# Patient Record
Sex: FEMALE | Race: WHITE | URBAN - METROPOLITAN AREA
[De-identification: names, ages, dates, MRNs, and addresses within clinical notes are randomized per-mention and may not be internally consistent; named-entity substitution may affect disease eponyms.]

---

## 2020-10-20 ENCOUNTER — HOSPITAL ENCOUNTER (INPATIENT)
Age: 40
LOS: 2 days | Discharge: HOME | DRG: 438 | End: 2020-10-22
Attending: EMERGENCY MEDICINE | Admitting: FAMILY MEDICINE

## 2020-10-20 ENCOUNTER — APPOINTMENT (OUTPATIENT)
Dept: CT IMAGING | Age: 40
DRG: 438 | End: 2020-10-20
Attending: NURSE PRACTITIONER

## 2020-10-20 ENCOUNTER — APPOINTMENT (OUTPATIENT)
Dept: MRI IMAGING | Age: 40
DRG: 438 | End: 2020-10-20
Attending: FAMILY MEDICINE

## 2020-10-20 DIAGNOSIS — R11.2 NON-INTRACTABLE VOMITING WITH NAUSEA, UNSPECIFIED VOMITING TYPE: ICD-10-CM

## 2020-10-20 DIAGNOSIS — B18.2 CHRONIC HEPATITIS C WITHOUT HEPATIC COMA (HCC): ICD-10-CM

## 2020-10-20 DIAGNOSIS — K70.9 ALCOHOLIC LIVER DISEASE (HCC): ICD-10-CM

## 2020-10-20 DIAGNOSIS — K85.20 ALCOHOL-INDUCED ACUTE PANCREATITIS, UNSPECIFIED COMPLICATION STATUS: Primary | ICD-10-CM

## 2020-10-20 DIAGNOSIS — K70.10 ALCOHOLIC HEPATITIS WITHOUT ASCITES: ICD-10-CM

## 2020-10-20 PROBLEM — K85.90 ACUTE PANCREATITIS: Status: ACTIVE | Noted: 2020-10-20

## 2020-10-20 LAB
ALBUMIN SERPL-MCNC: 4.2 G/DL (ref 3.5–5)
ALBUMIN/GLOB SERPL: 1.2 {RATIO} (ref 1.1–2.2)
ALP SERPL-CCNC: 138 U/L (ref 45–117)
ALT SERPL-CCNC: 185 U/L (ref 12–78)
AMPHET UR QL SCN: NEGATIVE
ANION GAP SERPL CALC-SCNC: 17 MMOL/L (ref 5–15)
APPEARANCE UR: ABNORMAL
AST SERPL-CCNC: 373 U/L (ref 15–37)
BACTERIA URNS QL MICRO: ABNORMAL /HPF
BARBITURATES UR QL SCN: NEGATIVE
BASOPHILS # BLD: 0.1 K/UL (ref 0–0.1)
BASOPHILS NFR BLD: 1 % (ref 0–1)
BENZODIAZ UR QL: NEGATIVE
BILIRUB SERPL-MCNC: 1.4 MG/DL (ref 0.2–1)
BILIRUB UR QL: NEGATIVE
BUN SERPL-MCNC: 7 MG/DL (ref 6–20)
BUN/CREAT SERPL: 9 (ref 12–20)
CALCIUM SERPL-MCNC: 9.3 MG/DL (ref 8.5–10.1)
CANNABINOIDS UR QL SCN: NEGATIVE
CHLORIDE SERPL-SCNC: 98 MMOL/L (ref 97–108)
CHOLEST SERPL-MCNC: 200 MG/DL
CO2 SERPL-SCNC: 21 MMOL/L (ref 21–32)
COCAINE UR QL SCN: POSITIVE
COLOR UR: ABNORMAL
COMMENT, HOLDF: NORMAL
CREAT SERPL-MCNC: 0.76 MG/DL (ref 0.55–1.02)
DIFFERENTIAL METHOD BLD: ABNORMAL
DRUG SCRN COMMENT,DRGCM: ABNORMAL
EOSINOPHIL # BLD: 0 K/UL (ref 0–0.4)
EOSINOPHIL NFR BLD: 0 % (ref 0–7)
EPITH CASTS URNS QL MICRO: ABNORMAL /LPF
ERYTHROCYTE [DISTWIDTH] IN BLOOD BY AUTOMATED COUNT: 13.7 % (ref 11.5–14.5)
ETHANOL SERPL-MCNC: 54 MG/DL
GLOBULIN SER CALC-MCNC: 3.4 G/DL (ref 2–4)
GLUCOSE BLD STRIP.AUTO-MCNC: 72 MG/DL (ref 65–100)
GLUCOSE SERPL-MCNC: 97 MG/DL (ref 65–100)
GLUCOSE UR STRIP.AUTO-MCNC: NEGATIVE MG/DL
HAV IGM SER QL: NONREACTIVE
HBV CORE IGM SER QL: NONREACTIVE
HBV SURFACE AG SER QL: 0.13 INDEX
HBV SURFACE AG SER QL: NEGATIVE
HCG UR QL: NEGATIVE
HCT VFR BLD AUTO: 39.2 % (ref 35–47)
HCV AB SER IA-ACNC: 1.57 INDEX
HCV AB SERPL QL IA: REACTIVE
HCV COMMENT,HCGAC: ABNORMAL
HDLC SERPL-MCNC: 43 MG/DL
HDLC SERPL: 4.7 {RATIO} (ref 0–5)
HGB BLD-MCNC: 13 G/DL (ref 11.5–16)
HGB UR QL STRIP: NEGATIVE
IMM GRANULOCYTES # BLD AUTO: 0.1 K/UL (ref 0–0.04)
IMM GRANULOCYTES NFR BLD AUTO: 1 % (ref 0–0.5)
INR PPP: 1.2 (ref 0.9–1.1)
KETONES UR QL STRIP.AUTO: 80 MG/DL
LACTATE SERPL-SCNC: 2.2 MMOL/L (ref 0.4–2)
LACTATE SERPL-SCNC: 3.7 MMOL/L (ref 0.4–2)
LACTATE SERPL-SCNC: 6.5 MMOL/L (ref 0.4–2)
LDLC SERPL CALC-MCNC: 139.4 MG/DL (ref 0–100)
LEUKOCYTE ESTERASE UR QL STRIP.AUTO: ABNORMAL
LIPASE SERPL-CCNC: >3000 U/L (ref 73–393)
LIPID PROFILE,FLP: ABNORMAL
LYMPHOCYTES # BLD: 0.7 K/UL (ref 0.8–3.5)
LYMPHOCYTES NFR BLD: 6 % (ref 12–49)
MAGNESIUM SERPL-MCNC: 1.8 MG/DL (ref 1.6–2.4)
MAGNESIUM SERPL-MCNC: 1.9 MG/DL (ref 1.6–2.4)
MCH RBC QN AUTO: 35.6 PG (ref 26–34)
MCHC RBC AUTO-ENTMCNC: 33.2 G/DL (ref 30–36.5)
MCV RBC AUTO: 107.4 FL (ref 80–99)
METHADONE UR QL: NEGATIVE
MONOCYTES # BLD: 1.3 K/UL (ref 0–1)
MONOCYTES NFR BLD: 11 % (ref 5–13)
NEUTS SEG # BLD: 9.5 K/UL (ref 1.8–8)
NEUTS SEG NFR BLD: 81 % (ref 32–75)
NITRITE UR QL STRIP.AUTO: NEGATIVE
NRBC # BLD: 0 K/UL (ref 0–0.01)
NRBC BLD-RTO: 0 PER 100 WBC
OPIATES UR QL: POSITIVE
PCP UR QL: NEGATIVE
PH UR STRIP: 5 [PH] (ref 5–8)
PHOSPHATE SERPL-MCNC: 4.2 MG/DL (ref 2.6–4.7)
PHOSPHATE SERPL-MCNC: 4.6 MG/DL (ref 2.6–4.7)
PLATELET # BLD AUTO: 216 K/UL (ref 150–400)
PMV BLD AUTO: 10.3 FL (ref 8.9–12.9)
POTASSIUM SERPL-SCNC: 4.1 MMOL/L (ref 3.5–5.1)
PROT SERPL-MCNC: 7.6 G/DL (ref 6.4–8.2)
PROT UR STRIP-MCNC: 30 MG/DL
PROTHROMBIN TIME: 12.2 SEC (ref 9–11.1)
RBC # BLD AUTO: 3.65 M/UL (ref 3.8–5.2)
RBC #/AREA URNS HPF: ABNORMAL /HPF (ref 0–5)
RBC MORPH BLD: ABNORMAL
SAMPLES BEING HELD,HOLD: NORMAL
SERVICE CMNT-IMP: NORMAL
SODIUM SERPL-SCNC: 136 MMOL/L (ref 136–145)
SP GR UR REFRACTOMETRY: 1.03 (ref 1–1.03)
SP1: ABNORMAL
SP2: ABNORMAL
SP3: ABNORMAL
TRIGL SERPL-MCNC: 88 MG/DL (ref ?–150)
TROPONIN I SERPL-MCNC: <0.05 NG/ML
UR CULT HOLD, URHOLD: NORMAL
UROBILINOGEN UR QL STRIP.AUTO: 0.2 EU/DL (ref 0.2–1)
VLDLC SERPL CALC-MCNC: 17.6 MG/DL
WBC # BLD AUTO: 11.7 K/UL (ref 3.6–11)
WBC URNS QL MICRO: ABNORMAL /HPF (ref 0–4)

## 2020-10-20 PROCEDURE — 83605 ASSAY OF LACTIC ACID: CPT

## 2020-10-20 PROCEDURE — 85025 COMPLETE CBC W/AUTO DIFF WBC: CPT

## 2020-10-20 PROCEDURE — 87147 CULTURE TYPE IMMUNOLOGIC: CPT

## 2020-10-20 PROCEDURE — 74011000258 HC RX REV CODE- 258: Performed by: RADIOLOGY

## 2020-10-20 PROCEDURE — 85610 PROTHROMBIN TIME: CPT

## 2020-10-20 PROCEDURE — 36415 COLL VENOUS BLD VENIPUNCTURE: CPT

## 2020-10-20 PROCEDURE — 80307 DRUG TEST PRSMV CHEM ANLYZR: CPT

## 2020-10-20 PROCEDURE — 74011000258 HC RX REV CODE- 258: Performed by: FAMILY MEDICINE

## 2020-10-20 PROCEDURE — 81001 URINALYSIS AUTO W/SCOPE: CPT

## 2020-10-20 PROCEDURE — 96375 TX/PRO/DX INJ NEW DRUG ADDON: CPT

## 2020-10-20 PROCEDURE — 99284 EMERGENCY DEPT VISIT MOD MDM: CPT

## 2020-10-20 PROCEDURE — 74181 MRI ABDOMEN W/O CONTRAST: CPT

## 2020-10-20 PROCEDURE — 96374 THER/PROPH/DIAG INJ IV PUSH: CPT

## 2020-10-20 PROCEDURE — 81025 URINE PREGNANCY TEST: CPT

## 2020-10-20 PROCEDURE — 96361 HYDRATE IV INFUSION ADD-ON: CPT

## 2020-10-20 PROCEDURE — 80074 ACUTE HEPATITIS PANEL: CPT

## 2020-10-20 PROCEDURE — 84484 ASSAY OF TROPONIN QUANT: CPT

## 2020-10-20 PROCEDURE — 74011000636 HC RX REV CODE- 636: Performed by: RADIOLOGY

## 2020-10-20 PROCEDURE — 82962 GLUCOSE BLOOD TEST: CPT

## 2020-10-20 PROCEDURE — 84100 ASSAY OF PHOSPHORUS: CPT

## 2020-10-20 PROCEDURE — 80053 COMPREHEN METABOLIC PANEL: CPT

## 2020-10-20 PROCEDURE — 65660000000 HC RM CCU STEPDOWN

## 2020-10-20 PROCEDURE — 83690 ASSAY OF LIPASE: CPT

## 2020-10-20 PROCEDURE — 74177 CT ABD & PELVIS W/CONTRAST: CPT

## 2020-10-20 PROCEDURE — 74011250636 HC RX REV CODE- 250/636: Performed by: FAMILY MEDICINE

## 2020-10-20 PROCEDURE — 83735 ASSAY OF MAGNESIUM: CPT

## 2020-10-20 PROCEDURE — 87040 BLOOD CULTURE FOR BACTERIA: CPT

## 2020-10-20 PROCEDURE — 74011250636 HC RX REV CODE- 250/636: Performed by: NURSE PRACTITIONER

## 2020-10-20 PROCEDURE — 87086 URINE CULTURE/COLONY COUNT: CPT

## 2020-10-20 PROCEDURE — 80061 LIPID PANEL: CPT

## 2020-10-20 RX ORDER — LORAZEPAM 2 MG/ML
2 INJECTION INTRAMUSCULAR
Status: DISPENSED | OUTPATIENT
Start: 2020-10-20 | End: 2020-10-20

## 2020-10-20 RX ORDER — MORPHINE SULFATE 2 MG/ML
2 INJECTION, SOLUTION INTRAMUSCULAR; INTRAVENOUS
Status: DISCONTINUED | OUTPATIENT
Start: 2020-10-20 | End: 2020-10-22

## 2020-10-20 RX ORDER — DICYCLOMINE HYDROCHLORIDE 10 MG/ML
20 INJECTION INTRAMUSCULAR
Status: COMPLETED | OUTPATIENT
Start: 2020-10-20 | End: 2020-10-20

## 2020-10-20 RX ORDER — SODIUM CHLORIDE 9 MG/ML
1000 INJECTION, SOLUTION INTRAVENOUS ONCE
Status: COMPLETED | OUTPATIENT
Start: 2020-10-20 | End: 2020-10-20

## 2020-10-20 RX ORDER — LORAZEPAM 2 MG/ML
1 INJECTION INTRAMUSCULAR
Status: DISCONTINUED | OUTPATIENT
Start: 2020-10-20 | End: 2020-10-22

## 2020-10-20 RX ORDER — KETOROLAC TROMETHAMINE 30 MG/ML
30 INJECTION, SOLUTION INTRAMUSCULAR; INTRAVENOUS
Status: COMPLETED | OUTPATIENT
Start: 2020-10-20 | End: 2020-10-20

## 2020-10-20 RX ORDER — MORPHINE SULFATE 4 MG/ML
4 INJECTION INTRAVENOUS ONCE
Status: COMPLETED | OUTPATIENT
Start: 2020-10-20 | End: 2020-10-20

## 2020-10-20 RX ORDER — SODIUM CHLORIDE 0.9 % (FLUSH) 0.9 %
5-40 SYRINGE (ML) INJECTION AS NEEDED
Status: DISCONTINUED | OUTPATIENT
Start: 2020-10-20 | End: 2020-10-22 | Stop reason: HOSPADM

## 2020-10-20 RX ORDER — ONDANSETRON 2 MG/ML
4 INJECTION INTRAMUSCULAR; INTRAVENOUS
Status: COMPLETED | OUTPATIENT
Start: 2020-10-20 | End: 2020-10-20

## 2020-10-20 RX ORDER — HYDROMORPHONE HYDROCHLORIDE 1 MG/ML
1 INJECTION, SOLUTION INTRAMUSCULAR; INTRAVENOUS; SUBCUTANEOUS
Status: DISCONTINUED | OUTPATIENT
Start: 2020-10-20 | End: 2020-10-20

## 2020-10-20 RX ORDER — SODIUM CHLORIDE, SODIUM LACTATE, POTASSIUM CHLORIDE, CALCIUM CHLORIDE 600; 310; 30; 20 MG/100ML; MG/100ML; MG/100ML; MG/100ML
150 INJECTION, SOLUTION INTRAVENOUS CONTINUOUS
Status: DISCONTINUED | OUTPATIENT
Start: 2020-10-20 | End: 2020-10-22

## 2020-10-20 RX ORDER — SODIUM CHLORIDE 0.9 % (FLUSH) 0.9 %
10 SYRINGE (ML) INJECTION
Status: COMPLETED | OUTPATIENT
Start: 2020-10-20 | End: 2020-10-20

## 2020-10-20 RX ORDER — SODIUM CHLORIDE 0.9 % (FLUSH) 0.9 %
5-40 SYRINGE (ML) INJECTION EVERY 8 HOURS
Status: DISCONTINUED | OUTPATIENT
Start: 2020-10-20 | End: 2020-10-22 | Stop reason: HOSPADM

## 2020-10-20 RX ADMIN — ONDANSETRON 4 MG: 2 INJECTION INTRAMUSCULAR; INTRAVENOUS at 11:05

## 2020-10-20 RX ADMIN — DICYCLOMINE HYDROCHLORIDE 20 MG: 10 INJECTION INTRAMUSCULAR at 11:05

## 2020-10-20 RX ADMIN — Medication 10 ML: at 18:17

## 2020-10-20 RX ADMIN — MORPHINE SULFATE 2 MG: 2 INJECTION, SOLUTION INTRAMUSCULAR; INTRAVENOUS at 22:20

## 2020-10-20 RX ADMIN — Medication 10 ML: at 12:13

## 2020-10-20 RX ADMIN — SODIUM CHLORIDE 1000 ML/HR: 900 INJECTION, SOLUTION INTRAVENOUS at 11:05

## 2020-10-20 RX ADMIN — Medication 10 ML: at 22:24

## 2020-10-20 RX ADMIN — SODIUM CHLORIDE 100 ML: 900 INJECTION, SOLUTION INTRAVENOUS at 12:12

## 2020-10-20 RX ADMIN — LORAZEPAM 2 MG: 2 INJECTION INTRAMUSCULAR; INTRAVENOUS at 13:56

## 2020-10-20 RX ADMIN — SODIUM CHLORIDE 1000 ML/HR: 900 INJECTION, SOLUTION INTRAVENOUS at 12:24

## 2020-10-20 RX ADMIN — CEFTRIAXONE SODIUM 1 G: 1 INJECTION, POWDER, FOR SOLUTION INTRAMUSCULAR; INTRAVENOUS at 18:21

## 2020-10-20 RX ADMIN — MORPHINE SULFATE 2 MG: 2 INJECTION, SOLUTION INTRAMUSCULAR; INTRAVENOUS at 18:17

## 2020-10-20 RX ADMIN — KETOROLAC TROMETHAMINE 30 MG: 30 INJECTION, SOLUTION INTRAMUSCULAR at 11:05

## 2020-10-20 RX ADMIN — IOPAMIDOL 100 ML: 755 INJECTION, SOLUTION INTRAVENOUS at 12:13

## 2020-10-20 RX ADMIN — SODIUM CHLORIDE, SODIUM LACTATE, POTASSIUM CHLORIDE, AND CALCIUM CHLORIDE 150 ML/HR: 600; 310; 30; 20 INJECTION, SOLUTION INTRAVENOUS at 16:26

## 2020-10-20 RX ADMIN — MORPHINE SULFATE 4 MG: 4 INJECTION INTRAVENOUS at 12:23

## 2020-10-20 RX ADMIN — LORAZEPAM 1 MG: 2 INJECTION INTRAMUSCULAR; INTRAVENOUS at 23:37

## 2020-10-20 NOTE — ED NOTES
TRANSFER - OUT REPORT:    Verbal report given to Nicki Santa RN (name) on Kristin Blind  being transferred to ,  (unit) for routine progression of care       Report consisted of patients Situation, Background, Assessment and   Recommendations(SBAR). Information from the following report(s) SBAR, ED Summary and MAR was reviewed with the receiving nurse. Lines:   Peripheral IV 10/20/20 Left Antecubital (Active)   Site Assessment Clean, dry, & intact 10/20/20 1018   Phlebitis Assessment 0 10/20/20 1018   Infiltration Assessment 0 10/20/20 1018   Dressing Status Clean, dry, & intact 10/20/20 1018   Dressing Type Transparent 10/20/20 1018        Opportunity for questions and clarification was provided.       Patient transported with:   [employer]

## 2020-10-20 NOTE — ED TRIAGE NOTES
Triage Note: Patient is coming in from [facility]. Patient started with abdominal pain last night. Labs done at [facility] and stated to come over for further evaluation. Labs scanned into chart. Denies urinary symptoms.

## 2020-10-20 NOTE — H&P
History & Physical    Primary Care Provider: Estefania Vaughn MD  Source of Information: Patient     History of Presenting Illness:   Ant Hernandez is a 38 y.o. female who presents with abdominal pain    History is primary obtained from the patient    Patient reports that she started experiencing significant epigastric pain associated with nausea, vomiting, poor appetite and discomfort that started last night. Patient reports the pain was in the epigastric region, radiated throughout her abdomen, got worse while laying down, and she had multiple bouts of nonbilious nonbloody vomiting associated with the same. Patient reports that she drinks about a bottle of wine on a daily basis. Patient reports that she has never had pancreatitis before. Patient came to the ER was diagnosed with pancreatitis and was requested to be admitted under the hospitalist service. Patient reports that her last alcoholic drink was yesterday night. Patient denies any other complaints or problems. Denies any exposure to anyone diagnosed with COVID-19 recently    The patient denies any Headache, blurry vision, sore throat, trouble swallowing, trouble with speech, chest pain, SOB, cough, fever, chills urinary symptoms, constipation, recent travels, sick contacts, focal or generalized neurological symptoms,  falls, injuries, rashes, contact with COVID-19 diagnosed patients, hematemesis, melena, hemoptysis, hematuria, rashes, denies starting any new medications and denies any other concerns or problems besides as mentioned above. Review of Systems:  A comprehensive review of systems was negative except for that written in the History of Present Illness. History reviewed. No pertinent past medical history.    Past Surgical History:   Procedure Laterality Date    ABDOMEN SURGERY PROC UNLISTED       Prior to Admission medications    Not on File     Allergies   Allergen Reactions    Penicillins Itching      History reviewed. No pertinent family history. SOCIAL HISTORY:  Patient resides:  Independently x   Assisted Living    SNF    With family care       Smoking history:   None x   Former    Chronic      Alcohol history:   None    Social    Chronic x     Ambulates:   Independently x   w/cane    w/walker    w/wc    CODE STATUS:  DNR    Full x   Other      Objective:     Physical Exam:     Visit Vitals  /77 (BP 1 Location: Left arm, BP Patient Position: At rest)   Pulse 89   Temp 97.9 °F (36.6 °C)   Resp 18   Ht 5' 6\" (1.676 m)   Wt 55 kg (121 lb 4.1 oz)   LMP 09/20/2020   SpO2 99%   BMI 19.57 kg/m²      O2 Device: Room air    General : alert x 3, awake, no acute distress, resting in bed, pleasant female, appears to be stated age  [de-identified]: PEERL, EOMI, moist mucus membrane, TM clear  Neck: supple, no JVD, no meningeal signs  Chest: Clear to auscultation bilaterally   CVS: S1 S2 heard, Capillary refill less than 2 seconds  Abd: soft/tender to palpation diffusely/no rebound/mild guarding, bowel sounds were hypoactive  Ext: no clubbing, no cyanosis, no edema, brisk 2+ DP pulses  Neuro/Psych: pleasant mood and affect, CN 2-12 grossly intact, sensory grossly within normal limit, Strength 5/5 in all extremities, DTR 1+ x 4  Skin: warm            Data Review:     Recent Days:  Recent Labs     10/20/20  1017   WBC 11.7*   HGB 13.0   HCT 39.2        Recent Labs     10/20/20  1017      K 4.1   CL 98   CO2 21   GLU 97   BUN 7   CREA 0.76   CA 9.3   MG 1.9   PHOS 4.6   ALB 4.2   *   INR 1.2*     No results for input(s): PH, PCO2, PO2, HCO3, FIO2 in the last 72 hours.     24 Hour Results:  Recent Results (from the past 24 hour(s))   SAMPLES BEING HELD    Collection Time: 10/20/20 10:17 AM   Result Value Ref Range    SAMPLES BEING HELD 1RED,1BLU,1PST,1LAV,UA     COMMENT        Add-on orders for these samples will be processed based on acceptable specimen integrity and analyte stability, which may vary by analyte. CBC WITH AUTOMATED DIFF    Collection Time: 10/20/20 10:17 AM   Result Value Ref Range    WBC 11.7 (H) 3.6 - 11.0 K/uL    RBC 3.65 (L) 3.80 - 5.20 M/uL    HGB 13.0 11.5 - 16.0 g/dL    HCT 39.2 35.0 - 47.0 %    .4 (H) 80.0 - 99.0 FL    MCH 35.6 (H) 26.0 - 34.0 PG    MCHC 33.2 30.0 - 36.5 g/dL    RDW 13.7 11.5 - 14.5 %    PLATELET 660 642 - 391 K/uL    MPV 10.3 8.9 - 12.9 FL    NRBC 0.0 0  WBC    ABSOLUTE NRBC 0.00 0.00 - 0.01 K/uL    NEUTROPHILS 81 (H) 32 - 75 %    LYMPHOCYTES 6 (L) 12 - 49 %    MONOCYTES 11 5 - 13 %    EOSINOPHILS 0 0 - 7 %    BASOPHILS 1 0 - 1 %    IMMATURE GRANULOCYTES 1 (H) 0.0 - 0.5 %    ABS. NEUTROPHILS 9.5 (H) 1.8 - 8.0 K/UL    ABS. LYMPHOCYTES 0.7 (L) 0.8 - 3.5 K/UL    ABS. MONOCYTES 1.3 (H) 0.0 - 1.0 K/UL    ABS. EOSINOPHILS 0.0 0.0 - 0.4 K/UL    ABS. BASOPHILS 0.1 0.0 - 0.1 K/UL    ABS. IMM. GRANS. 0.1 (H) 0.00 - 0.04 K/UL    DF SMEAR SCANNED      RBC COMMENTS NORMOCYTIC, NORMOCHROMIC     METABOLIC PANEL, COMPREHENSIVE    Collection Time: 10/20/20 10:17 AM   Result Value Ref Range    Sodium 136 136 - 145 mmol/L    Potassium 4.1 3.5 - 5.1 mmol/L    Chloride 98 97 - 108 mmol/L    CO2 21 21 - 32 mmol/L    Anion gap 17 (H) 5 - 15 mmol/L    Glucose 97 65 - 100 mg/dL    BUN 7 6 - 20 MG/DL    Creatinine 0.76 0.55 - 1.02 MG/DL    BUN/Creatinine ratio 9 (L) 12 - 20      GFR est AA >60 >60 ml/min/1.73m2    GFR est non-AA >60 >60 ml/min/1.73m2    Calcium 9.3 8.5 - 10.1 MG/DL    Bilirubin, total 1.4 (H) 0.2 - 1.0 MG/DL    ALT (SGPT) 185 (H) 12 - 78 U/L    AST (SGOT) 373 (H) 15 - 37 U/L    Alk.  phosphatase 138 (H) 45 - 117 U/L    Protein, total 7.6 6.4 - 8.2 g/dL    Albumin 4.2 3.5 - 5.0 g/dL    Globulin 3.4 2.0 - 4.0 g/dL    A-G Ratio 1.2 1.1 - 2.2     LIPASE    Collection Time: 10/20/20 10:17 AM   Result Value Ref Range    Lipase >3,000 (H) 73 - 393 U/L   ETHYL ALCOHOL    Collection Time: 10/20/20 10:17 AM   Result Value Ref Range    ALCOHOL(ETHYL),SERUM 54 (H) <10 MG/DL   MAGNESIUM    Collection Time: 10/20/20 10:17 AM   Result Value Ref Range    Magnesium 1.9 1.6 - 2.4 mg/dL   PHOSPHORUS    Collection Time: 10/20/20 10:17 AM   Result Value Ref Range    Phosphorus 4.6 2.6 - 4.7 MG/DL   PROTHROMBIN TIME + INR    Collection Time: 10/20/20 10:17 AM   Result Value Ref Range    INR 1.2 (H) 0.9 - 1.1      Prothrombin time 12.2 (H) 9.0 - 11.1 sec   URINALYSIS W/MICROSCOPIC    Collection Time: 10/20/20 10:35 AM   Result Value Ref Range    Color YELLOW/STRAW      Appearance CLOUDY (A) CLEAR      Specific gravity 1.026 1.003 - 1.030      pH (UA) 5.0 5.0 - 8.0      Protein 30 (A) NEG mg/dL    Glucose Negative NEG mg/dL    Ketone 80 (A) NEG mg/dL    Bilirubin Negative NEG      Blood Negative NEG      Urobilinogen 0.2 0.2 - 1.0 EU/dL    Nitrites Negative NEG      Leukocyte Esterase TRACE (A) NEG      WBC 0-4 0 - 4 /hpf    RBC 0-5 0 - 5 /hpf    Epithelial cells MANY (A) FEW /lpf    Bacteria 2+ (A) NEG /hpf   URINE CULTURE HOLD SAMPLE    Collection Time: 10/20/20 10:35 AM    Specimen: Serum; Urine   Result Value Ref Range    Urine culture hold        Urine on hold in Microbiology dept for 2 days. If unpreserved urine is submitted, it cannot be used for addtional testing after 24 hours, recollection will be required.    HCG URINE, QL. - POC    Collection Time: 10/20/20 10:37 AM   Result Value Ref Range    Pregnancy test,urine (POC) Negative NEG     LACTIC ACID    Collection Time: 10/20/20 10:38 AM   Result Value Ref Range    Lactic acid 6.5 (HH) 0.4 - 2.0 MMOL/L   LACTIC ACID    Collection Time: 10/20/20  1:08 PM   Result Value Ref Range    Lactic acid 3.7 (HH) 0.4 - 2.0 MMOL/L   GLUCOSE, POC    Collection Time: 10/20/20  1:10 PM   Result Value Ref Range    Glucose (POC) 72 65 - 100 mg/dL    Performed by Cassie An          Imaging:   Ct Abd Pelv W Cont    Result Date: 10/20/2020  IMPRESSION: Findings compatible with acute pancreatitis with inflammatory changes extending into the mesentery are anterior pararenal space bilaterally. Mild free fluid. Hepatic steatosis. No gallstones are identified by CT.     Assessment/Plan      Acute pancreatitis: Likely alcohol-related, admit patient to remote telemetry bed, aggressive IV hydration, pain control, n.p.o., will get an MRCP to rule out any obstructive pathology, lipid panel, supportive care and close monitoring, may consider getting a GI consult if symptoms persist, reassess as needed, empirical antibiotic    Alcohol abuse: Patient high risk for delirium tremens, CIWA protocol, banana bag, Ativan as needed, seizure prophylaxis, telemetry monitoring, reassess as needed, further intervention per hospital course, continue to monitor    Lactic acidosis: Likely alcohol-related, trending down, IV hydration, repeat lactate levels in 4 hours, empirical antibiotic although suspicion for infection is low, blood cultures, supportive care and close monitoring, reassess as needed    Elevated LFTs: Likely alcoholic liver disease, MRCP pending, trend, may consider getting a pathology consult, avoid hepatotoxic medication, supportive care and close monitoring, reassess as needed, hepatitis panel pending, coagulation studies pending    GI DVT prophylaxis: Patient will be on heparin              Signed By: Go Schwartz MD     October 20, 2020

## 2020-10-20 NOTE — ROUTINE PROCESS
TRANSFER - IN REPORT: 
 
Verbal report received from Maxx Wang (name) on Tennille Driscoll  being received from ED (unit) for routine progression of care Report consisted of patients Situation, Background, Assessment and  
Recommendations(SBAR). Information from the following report(s) SBAR and Kardex was reviewed with the receiving nurse. Opportunity for questions and clarification was provided. Assessment completed upon patients arrival to unit and care assumed.

## 2020-10-20 NOTE — ED PROVIDER NOTES
39 y/o  Female started, seen at [facility] prior to arrival.    Endorses blood in urine that started today, denies being on menses. LMP 9/20/20. Mid-abdominal pain started at rest, describes as a big tight cramp that will not release, N/V- all night and this day. Denies previous abdominal surgery, states that she drinks wine nightly./  Denies urinary frequency, burning or pain. Denies fever, chills, cough, CP, SOB diarrhea. States that she has not seen OB/GYN in the last couple years. History reviewed. No pertinent past medical history. Past Surgical History:   Procedure Laterality Date    ABDOMEN SURGERY PROC UNLISTED           History reviewed. No pertinent family history.     Social History     Socioeconomic History    Marital status: SINGLE     Spouse name: Not on file    Number of children: Not on file    Years of education: Not on file    Highest education level: Not on file   Occupational History    Not on file   Social Needs    Financial resource strain: Not on file    Food insecurity     Worry: Not on file     Inability: Not on file    Transportation needs     Medical: Not on file     Non-medical: Not on file   Tobacco Use    Smoking status: Never Smoker    Smokeless tobacco: Never Used   Substance and Sexual Activity    Alcohol use: Yes     Comment: Socially    Drug use: Never    Sexual activity: Not on file   Lifestyle    Physical activity     Days per week: Not on file     Minutes per session: Not on file    Stress: Not on file   Relationships    Social connections     Talks on phone: Not on file     Gets together: Not on file     Attends [employer]ism service: Not on file     Active member of club or organization: Not on file     Attends meetings of clubs or organizations: Not on file     Relationship status: Not on file    Intimate partner violence     Fear of current or ex partner: Not on file     Emotionally abused: Not on file     Physically abused: Not on file Forced sexual activity: Not on file   Other Topics Concern    Not on file   Social History Narrative    Not on file         ALLERGIES: Penicillins    Review of Systems   Constitutional: Negative for chills and fever. Respiratory: Negative for shortness of breath. Cardiovascular: Negative for chest pain. Gastrointestinal: Positive for abdominal pain, nausea and vomiting. Negative for constipation and diarrhea. Mid-abdominal pain. Genitourinary: Positive for hematuria. Negative for difficulty urinating, dysuria, flank pain, frequency and urgency. Skin: Negative for rash. Vitals:    10/20/20 1008 10/20/20 1231   BP: 116/83 134/77   Pulse: (!) 126 89   Resp: 20 18   Temp: 97.9 °F (36.6 °C)    SpO2: 98% 99%   Weight: 55 kg (121 lb 4.1 oz)    Height: 5' 6\" (1.676 m)             Physical Exam  Vitals signs and nursing note reviewed. Constitutional:       Appearance: Normal appearance. HENT:      Head: Normocephalic. Nose: Nose normal.   Neck:      Musculoskeletal: Normal range of motion. Cardiovascular:      Rate and Rhythm: Regular rhythm. Tachycardia present. Pulmonary:      Effort: Pulmonary effort is normal. No respiratory distress. Breath sounds: Normal breath sounds. No decreased breath sounds, wheezing or rhonchi. Abdominal:      General: Abdomen is flat. Bowel sounds are normal. There is no distension. Palpations: Abdomen is soft. Tenderness: There is abdominal tenderness in the right upper quadrant, right lower quadrant, epigastric area, periumbilical area and left upper quadrant. There is guarding and rebound. There is no right CVA tenderness or left CVA tenderness. Positive signs include Ang's sign and McBurney's sign. Musculoskeletal: Normal range of motion. Skin:     General: Skin is dry. Neurological:      Mental Status: She is alert and oriented to person, place, and time.    Psychiatric:         Mood and Affect: Mood normal. MDM  Number of Diagnoses or Management Options  Diagnosis management comments: Possible: UTI vs pyelonephritis vs dehydration vs colitis vs gastritis vs pancreatitis  Plan: cbc/cmp/lipase/lactic, urine, ct abd/pel, analgesia, urine pregnancy       Amount and/or Complexity of Data Reviewed  Clinical lab tests: ordered  Tests in the radiology section of CPT®: ordered      VITAL SIGNS:  Patient Vitals for the past 4 hrs:   Pulse Resp BP SpO2   10/20/20 1231 89 18 134/77 99 %         LABS:  Recent Results (from the past 6 hour(s))   SAMPLES BEING HELD    Collection Time: 10/20/20 10:17 AM   Result Value Ref Range    SAMPLES BEING HELD 1RED,1BLU,1PST,1LAV,UA     COMMENT        Add-on orders for these samples will be processed based on acceptable specimen integrity and analyte stability, which may vary by analyte. CBC WITH AUTOMATED DIFF    Collection Time: 10/20/20 10:17 AM   Result Value Ref Range    WBC 11.7 (H) 3.6 - 11.0 K/uL    RBC 3.65 (L) 3.80 - 5.20 M/uL    HGB 13.0 11.5 - 16.0 g/dL    HCT 39.2 35.0 - 47.0 %    .4 (H) 80.0 - 99.0 FL    MCH 35.6 (H) 26.0 - 34.0 PG    MCHC 33.2 30.0 - 36.5 g/dL    RDW 13.7 11.5 - 14.5 %    PLATELET 444 050 - 080 K/uL    MPV 10.3 8.9 - 12.9 FL    NRBC 0.0 0  WBC    ABSOLUTE NRBC 0.00 0.00 - 0.01 K/uL    NEUTROPHILS 81 (H) 32 - 75 %    LYMPHOCYTES 6 (L) 12 - 49 %    MONOCYTES 11 5 - 13 %    EOSINOPHILS 0 0 - 7 %    BASOPHILS 1 0 - 1 %    IMMATURE GRANULOCYTES 1 (H) 0.0 - 0.5 %    ABS. NEUTROPHILS 9.5 (H) 1.8 - 8.0 K/UL    ABS. LYMPHOCYTES 0.7 (L) 0.8 - 3.5 K/UL    ABS. MONOCYTES 1.3 (H) 0.0 - 1.0 K/UL    ABS. EOSINOPHILS 0.0 0.0 - 0.4 K/UL    ABS. BASOPHILS 0.1 0.0 - 0.1 K/UL    ABS. IMM.  GRANS. 0.1 (H) 0.00 - 0.04 K/UL    DF SMEAR SCANNED      RBC COMMENTS NORMOCYTIC, NORMOCHROMIC     METABOLIC PANEL, COMPREHENSIVE    Collection Time: 10/20/20 10:17 AM   Result Value Ref Range    Sodium 136 136 - 145 mmol/L    Potassium 4.1 3.5 - 5.1 mmol/L    Chloride 98 97 - 108 mmol/L    CO2 21 21 - 32 mmol/L    Anion gap 17 (H) 5 - 15 mmol/L    Glucose 97 65 - 100 mg/dL    BUN 7 6 - 20 MG/DL    Creatinine 0.76 0.55 - 1.02 MG/DL    BUN/Creatinine ratio 9 (L) 12 - 20      GFR est AA >60 >60 ml/min/1.73m2    GFR est non-AA >60 >60 ml/min/1.73m2    Calcium 9.3 8.5 - 10.1 MG/DL    Bilirubin, total 1.4 (H) 0.2 - 1.0 MG/DL    ALT (SGPT) 185 (H) 12 - 78 U/L    AST (SGOT) 373 (H) 15 - 37 U/L    Alk. phosphatase 138 (H) 45 - 117 U/L    Protein, total 7.6 6.4 - 8.2 g/dL    Albumin 4.2 3.5 - 5.0 g/dL    Globulin 3.4 2.0 - 4.0 g/dL    A-G Ratio 1.2 1.1 - 2.2     LIPASE    Collection Time: 10/20/20 10:17 AM   Result Value Ref Range    Lipase >3,000 (H) 73 - 393 U/L   URINALYSIS W/MICROSCOPIC    Collection Time: 10/20/20 10:35 AM   Result Value Ref Range    Color YELLOW/STRAW      Appearance CLOUDY (A) CLEAR      Specific gravity 1.026 1.003 - 1.030      pH (UA) 5.0 5.0 - 8.0      Protein 30 (A) NEG mg/dL    Glucose Negative NEG mg/dL    Ketone 80 (A) NEG mg/dL    Bilirubin Negative NEG      Blood Negative NEG      Urobilinogen 0.2 0.2 - 1.0 EU/dL    Nitrites Negative NEG      Leukocyte Esterase TRACE (A) NEG      WBC 0-4 0 - 4 /hpf    RBC 0-5 0 - 5 /hpf    Epithelial cells MANY (A) FEW /lpf    Bacteria 2+ (A) NEG /hpf   URINE CULTURE HOLD SAMPLE    Collection Time: 10/20/20 10:35 AM    Specimen: Serum; Urine   Result Value Ref Range    Urine culture hold        Urine on hold in Microbiology dept for 2 days. If unpreserved urine is submitted, it cannot be used for addtional testing after 24 hours, recollection will be required.    HCG URINE, QL. - POC    Collection Time: 10/20/20 10:37 AM   Result Value Ref Range    Pregnancy test,urine (POC) Negative NEG     LACTIC ACID    Collection Time: 10/20/20 10:38 AM   Result Value Ref Range    Lactic acid 6.5 (HH) 0.4 - 2.0 MMOL/L   LACTIC ACID    Collection Time: 10/20/20  1:08 PM   Result Value Ref Range    Lactic acid 3.7 (HH) 0.4 - 2.0 MMOL/L   GLUCOSE, POC    Collection Time: 10/20/20  1:10 PM   Result Value Ref Range    Glucose (POC) 72 65 - 100 mg/dL    Performed by Alla Whalen         IMAGING:  CT ABD PELV W CONT   Final Result   IMPRESSION:   Findings compatible with acute pancreatitis with inflammatory changes extending   into the mesentery are anterior pararenal space bilaterally. Mild free fluid. Hepatic steatosis. No gallstones are identified by CT. Medications During Visit:  Medications   LORazepam (ATIVAN) injection 2 mg (2 mg IntraVENous Given 10/20/20 1356)   ondansetron (ZOFRAN) injection 4 mg (4 mg IntraVENous Given 10/20/20 1105)   ketorolac (TORADOL) injection 30 mg (30 mg IntraVENous Given 10/20/20 1105)   0.9% sodium chloride infusion (0 mL/hr IntraVENous IV Completed 10/20/20 1348)   dicyclomine (BENTYL) 10 mg/mL injection 20 mg (20 mg IntraMUSCular Given 10/20/20 1105)   sodium chloride 0.9 % bolus infusion 100 mL (0 mL IntraVENous IV Completed 10/20/20 1348)   iopamidoL (ISOVUE-370) 76 % injection 100 mL (100 mL IntraVENous Given 10/20/20 1213)   sodium chloride (NS) flush 10 mL (10 mL IntraVENous Given 10/20/20 1213)   0.9% sodium chloride infusion (0 mL/hr IntraVENous IV Completed 10/20/20 1348)   morphine injection 4 mg (4 mg IntraVENous Given 10/20/20 1223)         DECISION MAKING:  Lito Sutherland is a 38 y.o. female who comes in as above. Otherwise healthy female, presents sitting in the chair bending forward due to pain and discomfort across her mid abdomen, patient states that it abruptly started last night at rest, states that food or drink does not affect the pain, the pain is constant and tight and squeezing in nature. Patient denies previous event, or previous surgical abdomen. [facility] and advised to come to the ER for further evaluation. Patient tachycardic upon arrival, afebrile, otherwise stable vital signs at this time.   Patient does state that she drinks wine nightly. Discussed plan for labs, urine, treat for pain and nausea, and obtain CT of the abdomen. 1125 Re-evaluation: Patient states that the pain medication helped minimally, lab results discussed with patient thus far, remarkable for possible pancreatitis. We are still awaiting the CT results, discussed possible admission with patient pending CT results, patient verbalized understanding and agrees with admission. Additional IV fluid bolus ordered, along with pain medication, consulting tech who gloria patient's initial lab work, question whether or not the lactate was hemolyzed. Will recheck lactate after IV fluid bolus complete. Lactic Acid remains elevated at 3.7, increased AST/ALT, Lipase > 3,000. CIWA initiated for withdrawal symptoms, patient verbalizes understanding for admission and agrees. IMPRESSION:  1. Alcohol-induced acute pancreatitis, unspecified complication status    2. Non-intractable vomiting with nausea, unspecified vomiting type        DISPOSITION:  Admitted      There are no discharge medications for this patient. Follow-up Information    None       Perfect Serve Consult for Admission  1:20 PM    ED Room Number: 649/01  Patient Name and age:  Radha Wooten 38 y.o.  female  Working Diagnosis:   1. Alcohol-induced acute pancreatitis, unspecified complication status    2. Non-intractable vomiting with nausea, unspecified vomiting type        COVID-19 Suspicion:  no  Sepsis present:  no    Reassessment needed: Yes, patient requiring CIWA, starting to become tremourous  Code Status:  Full Code  Readmission: no  Isolation Requirements:  no  Recommended Level of Care:  med/surg  Department:[facility] ED - 21   Other:  Patient with acute pancreatitis, alcohol induced. Given 2 rounds of IVF bolus, pain medication, started CIWA monitoring, starting to become tremulous.             Procedures    Discussed patient care with Rusty Roberts MD. Attending was given the opportunity to see and evaluate the patient. Agrees with care plan as discussed.   Hillary Land NP  1:21 PM

## 2020-10-21 PROBLEM — E43 SEVERE PROTEIN-CALORIE MALNUTRITION (HCC): Status: ACTIVE | Noted: 2020-10-21

## 2020-10-21 LAB
ALBUMIN SERPL-MCNC: 2.7 G/DL (ref 3.5–5)
ALBUMIN/GLOB SERPL: 1.3 {RATIO} (ref 1.1–2.2)
ALP SERPL-CCNC: 84 U/L (ref 45–117)
ALT SERPL-CCNC: 89 U/L (ref 12–78)
ANION GAP SERPL CALC-SCNC: 7 MMOL/L (ref 5–15)
AST SERPL-CCNC: 149 U/L (ref 15–37)
BASOPHILS # BLD: 0 K/UL (ref 0–0.1)
BASOPHILS NFR BLD: 0 % (ref 0–1)
BILIRUB SERPL-MCNC: 1.3 MG/DL (ref 0.2–1)
BUN SERPL-MCNC: 6 MG/DL (ref 6–20)
BUN/CREAT SERPL: 15 (ref 12–20)
CALCIUM SERPL-MCNC: 8.1 MG/DL (ref 8.5–10.1)
CHLORIDE SERPL-SCNC: 104 MMOL/L (ref 97–108)
CO2 SERPL-SCNC: 24 MMOL/L (ref 21–32)
CREAT SERPL-MCNC: 0.4 MG/DL (ref 0.55–1.02)
DIFFERENTIAL METHOD BLD: ABNORMAL
EOSINOPHIL # BLD: 0 K/UL (ref 0–0.4)
EOSINOPHIL NFR BLD: 1 % (ref 0–7)
ERYTHROCYTE [DISTWIDTH] IN BLOOD BY AUTOMATED COUNT: 13.6 % (ref 11.5–14.5)
GLOBULIN SER CALC-MCNC: 2.1 G/DL (ref 2–4)
GLUCOSE SERPL-MCNC: 87 MG/DL (ref 65–100)
HCT VFR BLD AUTO: 27.7 % (ref 35–47)
HGB BLD-MCNC: 9.3 G/DL (ref 11.5–16)
IMM GRANULOCYTES # BLD AUTO: 0 K/UL (ref 0–0.04)
IMM GRANULOCYTES NFR BLD AUTO: 1 % (ref 0–0.5)
LACTATE SERPL-SCNC: 1.3 MMOL/L (ref 0.4–2)
LIPASE SERPL-CCNC: 1624 U/L (ref 73–393)
LYMPHOCYTES # BLD: 0.9 K/UL (ref 0.8–3.5)
LYMPHOCYTES NFR BLD: 15 % (ref 12–49)
MCH RBC QN AUTO: 36.2 PG (ref 26–34)
MCHC RBC AUTO-ENTMCNC: 33.6 G/DL (ref 30–36.5)
MCV RBC AUTO: 107.8 FL (ref 80–99)
MONOCYTES # BLD: 0.8 K/UL (ref 0–1)
MONOCYTES NFR BLD: 13 % (ref 5–13)
NEUTS SEG # BLD: 4.1 K/UL (ref 1.8–8)
NEUTS SEG NFR BLD: 71 % (ref 32–75)
NRBC # BLD: 0 K/UL (ref 0–0.01)
NRBC BLD-RTO: 0 PER 100 WBC
PLATELET # BLD AUTO: 104 K/UL (ref 150–400)
PMV BLD AUTO: 10.3 FL (ref 8.9–12.9)
POTASSIUM SERPL-SCNC: 3.7 MMOL/L (ref 3.5–5.1)
PROT SERPL-MCNC: 4.8 G/DL (ref 6.4–8.2)
RBC # BLD AUTO: 2.57 M/UL (ref 3.8–5.2)
SODIUM SERPL-SCNC: 135 MMOL/L (ref 136–145)
TROPONIN I SERPL-MCNC: <0.05 NG/ML
WBC # BLD AUTO: 5.8 K/UL (ref 3.6–11)

## 2020-10-21 PROCEDURE — 84484 ASSAY OF TROPONIN QUANT: CPT

## 2020-10-21 PROCEDURE — 74011000250 HC RX REV CODE- 250: Performed by: FAMILY MEDICINE

## 2020-10-21 PROCEDURE — 85025 COMPLETE CBC W/AUTO DIFF WBC: CPT

## 2020-10-21 PROCEDURE — 65270000029 HC RM PRIVATE

## 2020-10-21 PROCEDURE — 36415 COLL VENOUS BLD VENIPUNCTURE: CPT

## 2020-10-21 PROCEDURE — 83605 ASSAY OF LACTIC ACID: CPT

## 2020-10-21 PROCEDURE — 74011250636 HC RX REV CODE- 250/636: Performed by: FAMILY MEDICINE

## 2020-10-21 PROCEDURE — 80053 COMPREHEN METABOLIC PANEL: CPT

## 2020-10-21 PROCEDURE — 74011000258 HC RX REV CODE- 258: Performed by: FAMILY MEDICINE

## 2020-10-21 PROCEDURE — 83690 ASSAY OF LIPASE: CPT

## 2020-10-21 RX ADMIN — LORAZEPAM 1 MG: 2 INJECTION INTRAMUSCULAR; INTRAVENOUS at 18:43

## 2020-10-21 RX ADMIN — MORPHINE SULFATE 2 MG: 2 INJECTION, SOLUTION INTRAMUSCULAR; INTRAVENOUS at 21:45

## 2020-10-21 RX ADMIN — MORPHINE SULFATE 2 MG: 2 INJECTION, SOLUTION INTRAMUSCULAR; INTRAVENOUS at 04:12

## 2020-10-21 RX ADMIN — MORPHINE SULFATE 2 MG: 2 INJECTION, SOLUTION INTRAMUSCULAR; INTRAVENOUS at 17:41

## 2020-10-21 RX ADMIN — MORPHINE SULFATE 2 MG: 2 INJECTION, SOLUTION INTRAMUSCULAR; INTRAVENOUS at 13:27

## 2020-10-21 RX ADMIN — LORAZEPAM 1 MG: 2 INJECTION INTRAMUSCULAR; INTRAVENOUS at 23:11

## 2020-10-21 RX ADMIN — FOLIC ACID: 5 INJECTION, SOLUTION INTRAMUSCULAR; INTRAVENOUS; SUBCUTANEOUS at 09:12

## 2020-10-21 RX ADMIN — Medication 10 ML: at 21:46

## 2020-10-21 RX ADMIN — MORPHINE SULFATE 2 MG: 2 INJECTION, SOLUTION INTRAMUSCULAR; INTRAVENOUS at 09:14

## 2020-10-21 RX ADMIN — CEFTRIAXONE SODIUM 1 G: 1 INJECTION, POWDER, FOR SOLUTION INTRAMUSCULAR; INTRAVENOUS at 15:36

## 2020-10-21 RX ADMIN — Medication 10 ML: at 13:30

## 2020-10-21 NOTE — PROGRESS NOTES
Bedside shift change report given to Guillermo Schlatter (oncoming nurse) by Jorge Lockwood RN (offgoing nurse). Report included the following information SBAR, Kardex, MAR and Recent Results.

## 2020-10-21 NOTE — PROGRESS NOTES
LOLA:  1. Return home when stable. 2. Transport- friend. Care Management Interventions  PCP Verified by CM: Yes  Palliative Care Criteria Met (RRAT>21 & CHF Dx)?: No  MyChart Signup: No  Discharge Durable Medical Equipment: No  Health Maintenance Reviewed: Yes  Physical Therapy Consult: No  Occupational Therapy Consult: No  Speech Therapy Consult: No  Current Support Network: Lives Alone  Confirm Follow Up Transport: Self  The Plan for Transition of Care is Related to the Following Treatment Goals : home with family assistance when [place]. The Patient and/or Patient Representative was Provided with a Choice of Provider and Agrees with the Discharge Plan?: Yes   Resource Information Provided?: No  Discharge Location  Discharge Placement: Home with family assistance      Reason for Admission:   Acute pancreatitis                   RUR Score:          9%           Plan for utilizing home health:      No indication at this time. PCP: First and Last name:  Dr. Sharpe Seek   Name of Practice:    Are you a current patient: Yes/No: yes    Approximate date of last visit:    Can you participate in a virtual visit with your PCP:                     Current Advanced Directive/Advance Care Plan:  Not on file. Transition of Care Plan:                      Reviewed chart for transitions of care,and discussed in rounds. CM met with patient at bedside to explain role and offer support. Patient is alert and oriented x4, and confirmed demographics. She lives alone, independent with ADLs and IADLs prior to admission. She does not have any concerns for discharge. [employer] attempted to screen for Medicaid, patient does not qualify due to income of $4,000/month. CM will follow if any needs arise.       Flores Deluca [facility]

## 2020-10-21 NOTE — PROGRESS NOTES
Bedside shift change report given to Rama Sheriff RN (oncoming nurse) by Donnell Castellanos (offgoing nurse). Report included the following information SBAR, Kardex, MAR and Recent Results.

## 2020-10-21 NOTE — PROGRESS NOTES
Bedside shift change report given to [address] [place] [address] and Lee Woods RN (oncoming nurse) by Cleve Olivera RN (offgoing nurse). Report included the following information SBAR, Kardex, MAR and Recent Results.

## 2020-10-21 NOTE — PROGRESS NOTES
Comprehensive Nutrition Assessment    Type and Reason for Visit: Positive nutrition screen    Nutrition Recommendations/Plan:    1. Diet advancement per MD to: regular, low fat  2. Switch supplements to Ensure High Protein TID  3. Change to oral MVI, folic acid, thiamine once MVI goody bag complete  4. Standing scale - current wt is patient stated    Nutrition Assessment:    Pt admitted for acute pancreatitis. PMHx: none. Abd pain x 1 day  Prior to admit. Acute pancreatitis d/t ETOH abuse predicted with lipase elevated. MVI goody bag in place. Pt visited today. She reports UBW around 140# about 6 months ago with decrease in appetite over that period of time. No abd pain or specific cause identified but was drinking 1 bottle of wine per day per H&P. Agreeable to trial of Ensure Clear while on clear liquids (TID = 720kcal, 24g protein). Will follow for diet education when eating and closer to discharge. Severe wt loss of 13% x 6 months noted. Unable to complete NFPE, not appropriate at this time. Malnutrition Assessment:  Malnutrition Status:  Severe malnutrition    Context:  Chronic illness     Findings of the 6 clinical characteristics of malnutrition:   Energy Intake:  7 - 75% or less est energy requirements for 1 month or longer  Weight Loss:  7.00 - Greater than 10% over 6 months     Body Fat Loss:  Unable to assess,     Muscle Mass Loss:  Unable to assess,    Fluid Accumulation:  Unable to assess,     Strength:  Not performed     Nutritionally Significant Medications: MVI goody bag, ceftriaxone, LR @ 150ml/hr; ativan    Estimated Daily Nutrient Needs:  Energy (kcal):  0371-0568(MSJ x 1.2-1.3)  Protein (g):  66-77g (1.2-1.4g/kg)       Fluid (ml/day):  1590(30ml/kg)    Nutrition Related Findings:       BM: 10/19  Edema: none  Wounds:  None       Current Nutrition Therapies:   Diet: clear liquids  Supplements: none  Additional Caloric Sources: none    Meal intake: No data found.     Anthropometric Measures:  · Height:  5' 6\" (167.6 cm)  · Current Body Wt:  55.3 kg (122 lb)   · Admission Body Wt:  122 lb    · Usual Body Wt:  140 lb     · Ideal Body Wt:   :  93.8 %   Wt Readings from Last 10 Encounters:   10/20/20 55 kg (121 lb 4.1 oz)   10/20/20 55.3 kg (122 lb)     Nutrition Diagnosis:   · Severe malnutrition related to inadequate protein-energy intake as evidenced by poor intake prior to admission, weight loss greater than or equal to 10% in 6 months    Nutrition Interventions:   Food and/or Nutrient Delivery: Start oral nutrition supplement  Nutrition Education and Counseling: Education needed(low fat once diet advanced)  Coordination of Nutrition Care: Continued inpatient monitoring    Goals:  Diet advancement and tolerance in 5-7 days       Nutrition Monitoring and Evaluation:   Behavioral-Environmental Outcomes: Knowledge or skill, Readiness for change  Food/Nutrient Intake Outcomes: Food and nutrient intake, Supplement intake, Vitamin/mineral intake  Physical Signs/Symptoms Outcomes: Weight, Nausea/vomiting, GI status, Biochemical data    Discharge Planning:     Too soon to determine     Myrna Arevalo, RD  [address] , Pager #[phone] or via [facility]

## 2020-10-21 NOTE — PROGRESS NOTES
Patient resting in bed quietly, watching TV.      Problem: General Medical Care Plan  Goal: *Vital signs within specified parameters  Outcome: Progressing Towards Goal  Goal: *Labs within defined limits  Outcome: Progressing Towards Goal  Goal: *Absence of infection signs and symptoms  Outcome: Progressing Towards Goal  Goal: *Optimal pain control at patient's stated goal  Outcome: Progressing Towards Goal  Goal: *Skin integrity maintained  Outcome: Progressing Towards Goal  Goal: *Fluid volume balance  Outcome: Progressing Towards Goal  Goal: *Optimize nutritional status  Outcome: Progressing Towards Goal  Goal: *Anxiety reduced or absent  Outcome: Progressing Towards Goal  Goal: *Progressive mobility and function (eg: ADL's)  Outcome: Progressing Towards Goal     Problem: Patient Education: Go to Patient Education Activity  Goal: Patient/Family Education  Outcome: Progressing Towards Goal     Problem: Pain  Goal: *Control of Pain  Outcome: Progressing Towards Goal  Goal: *PALLIATIVE CARE:  Alleviation of Pain  Outcome: Progressing Towards Goal     Problem: Patient Education: Go to Patient Education Activity  Goal: Patient/Family Education  Outcome: Progressing Towards Goal     Problem: Discharge Planning  Goal: *Discharge to safe environment  Outcome: Progressing Towards Goal

## 2020-10-21 NOTE — ROUTINE PROCESS
Attempted to schedule appt with Dr. Tania Manuel and was unable to do so as the office has no available appts.  informed me that she will send an appt request to the nurse, and the nurse will contact the patient directly with appt info.

## 2020-10-22 VITALS
OXYGEN SATURATION: 96 % | HEART RATE: 98 BPM | BODY MASS INDEX: 21.87 KG/M2 | SYSTOLIC BLOOD PRESSURE: 109 MMHG | WEIGHT: 136.1 LBS | TEMPERATURE: 99.2 F | RESPIRATION RATE: 16 BRPM | HEIGHT: 66 IN | DIASTOLIC BLOOD PRESSURE: 63 MMHG

## 2020-10-22 LAB
ALBUMIN SERPL-MCNC: 2.8 G/DL (ref 3.5–5)
ALBUMIN/GLOB SERPL: 1.1 {RATIO} (ref 1.1–2.2)
ALP SERPL-CCNC: 99 U/L (ref 45–117)
ALT SERPL-CCNC: 79 U/L (ref 12–78)
ANION GAP SERPL CALC-SCNC: 7 MMOL/L (ref 5–15)
AST SERPL-CCNC: 132 U/L (ref 15–37)
BACTERIA SPEC CULT: ABNORMAL
BASOPHILS # BLD: 0 K/UL (ref 0–0.1)
BASOPHILS NFR BLD: 0 % (ref 0–1)
BILIRUB SERPL-MCNC: 1 MG/DL (ref 0.2–1)
BUN SERPL-MCNC: 2 MG/DL (ref 6–20)
BUN/CREAT SERPL: 5 (ref 12–20)
CALCIUM SERPL-MCNC: 8.3 MG/DL (ref 8.5–10.1)
CC UR VC: ABNORMAL
CHLORIDE SERPL-SCNC: 106 MMOL/L (ref 97–108)
CO2 SERPL-SCNC: 28 MMOL/L (ref 21–32)
CREAT SERPL-MCNC: 0.37 MG/DL (ref 0.55–1.02)
DIFFERENTIAL METHOD BLD: ABNORMAL
EOSINOPHIL # BLD: 0.1 K/UL (ref 0–0.4)
EOSINOPHIL NFR BLD: 1 % (ref 0–7)
ERYTHROCYTE [DISTWIDTH] IN BLOOD BY AUTOMATED COUNT: 13.2 % (ref 11.5–14.5)
FERRITIN SERPL-MCNC: 728 NG/ML (ref 8–252)
GLOBULIN SER CALC-MCNC: 2.5 G/DL (ref 2–4)
GLUCOSE SERPL-MCNC: 101 MG/DL (ref 65–100)
HCT VFR BLD AUTO: 28.6 % (ref 35–47)
HGB BLD-MCNC: 9.4 G/DL (ref 11.5–16)
IMM GRANULOCYTES # BLD AUTO: 0 K/UL (ref 0–0.04)
IMM GRANULOCYTES NFR BLD AUTO: 0 % (ref 0–0.5)
IRON SATN MFR SERPL: 44 % (ref 20–50)
IRON SERPL-MCNC: 72 UG/DL (ref 35–150)
LIPASE SERPL-CCNC: 407 U/L (ref 73–393)
LYMPHOCYTES # BLD: 0.9 K/UL (ref 0.8–3.5)
LYMPHOCYTES NFR BLD: 17 % (ref 12–49)
MCH RBC QN AUTO: 35.3 PG (ref 26–34)
MCHC RBC AUTO-ENTMCNC: 32.9 G/DL (ref 30–36.5)
MCV RBC AUTO: 107.5 FL (ref 80–99)
MONOCYTES # BLD: 0.8 K/UL (ref 0–1)
MONOCYTES NFR BLD: 15 % (ref 5–13)
NEUTS SEG # BLD: 3.7 K/UL (ref 1.8–8)
NEUTS SEG NFR BLD: 67 % (ref 32–75)
NRBC # BLD: 0 K/UL (ref 0–0.01)
NRBC BLD-RTO: 0 PER 100 WBC
PLATELET # BLD AUTO: 97 K/UL (ref 150–400)
PMV BLD AUTO: 10.9 FL (ref 8.9–12.9)
POTASSIUM SERPL-SCNC: 3.4 MMOL/L (ref 3.5–5.1)
PROT SERPL-MCNC: 5.3 G/DL (ref 6.4–8.2)
RBC # BLD AUTO: 2.66 M/UL (ref 3.8–5.2)
RBC MORPH BLD: ABNORMAL
SERVICE CMNT-IMP: ABNORMAL
SODIUM SERPL-SCNC: 141 MMOL/L (ref 136–145)
TIBC SERPL-MCNC: 162 UG/DL (ref 250–450)
WBC # BLD AUTO: 5.5 K/UL (ref 3.6–11)

## 2020-10-22 PROCEDURE — 74011250637 HC RX REV CODE- 250/637: Performed by: NURSE PRACTITIONER

## 2020-10-22 PROCEDURE — 74011250637 HC RX REV CODE- 250/637: Performed by: INTERNAL MEDICINE

## 2020-10-22 PROCEDURE — 83540 ASSAY OF IRON: CPT

## 2020-10-22 PROCEDURE — 36415 COLL VENOUS BLD VENIPUNCTURE: CPT

## 2020-10-22 PROCEDURE — 83690 ASSAY OF LIPASE: CPT

## 2020-10-22 PROCEDURE — 85025 COMPLETE CBC W/AUTO DIFF WBC: CPT

## 2020-10-22 PROCEDURE — 99223 1ST HOSP IP/OBS HIGH 75: CPT | Performed by: HOSPITALIST

## 2020-10-22 PROCEDURE — 80053 COMPREHEN METABOLIC PANEL: CPT

## 2020-10-22 PROCEDURE — 74011250636 HC RX REV CODE- 250/636: Performed by: FAMILY MEDICINE

## 2020-10-22 PROCEDURE — 82728 ASSAY OF FERRITIN: CPT

## 2020-10-22 RX ORDER — OXYCODONE HYDROCHLORIDE 5 MG/1
5 TABLET ORAL
Qty: 6 TAB | Refills: 0 | Status: SHIPPED | OUTPATIENT
Start: 2020-10-22 | End: 2020-10-24

## 2020-10-22 RX ORDER — NALOXONE HYDROCHLORIDE 4 MG/.1ML
SPRAY NASAL
Qty: 1 EACH | Refills: 0 | Status: SHIPPED | OUTPATIENT
Start: 2020-10-22

## 2020-10-22 RX ORDER — FOLIC ACID 1 MG/1
1 TABLET ORAL DAILY
Qty: 30 TAB | Refills: 0 | Status: SHIPPED | OUTPATIENT
Start: 2020-10-22

## 2020-10-22 RX ORDER — LANOLIN ALCOHOL/MO/W.PET/CERES
100 CREAM (GRAM) TOPICAL DAILY
Status: DISCONTINUED | OUTPATIENT
Start: 2020-10-22 | End: 2020-10-22 | Stop reason: HOSPADM

## 2020-10-22 RX ORDER — SODIUM CHLORIDE, SODIUM LACTATE, POTASSIUM CHLORIDE, CALCIUM CHLORIDE 600; 310; 30; 20 MG/100ML; MG/100ML; MG/100ML; MG/100ML
125 INJECTION, SOLUTION INTRAVENOUS CONTINUOUS
Status: CANCELLED | OUTPATIENT
Start: 2020-10-22

## 2020-10-22 RX ORDER — LANOLIN ALCOHOL/MO/W.PET/CERES
100 CREAM (GRAM) TOPICAL DAILY
Qty: 30 TAB | Refills: 0 | Status: SHIPPED | OUTPATIENT
Start: 2020-10-22

## 2020-10-22 RX ORDER — THERA TABS 400 MCG
1 TAB ORAL DAILY
Qty: 30 TAB | Refills: 0 | Status: SHIPPED | OUTPATIENT
Start: 2020-10-22

## 2020-10-22 RX ORDER — FOLIC ACID 1 MG/1
1 TABLET ORAL DAILY
Status: DISCONTINUED | OUTPATIENT
Start: 2020-10-22 | End: 2020-10-22 | Stop reason: HOSPADM

## 2020-10-22 RX ORDER — OXYCODONE HYDROCHLORIDE 5 MG/1
5 TABLET ORAL
Status: DISCONTINUED | OUTPATIENT
Start: 2020-10-22 | End: 2020-10-22 | Stop reason: HOSPADM

## 2020-10-22 RX ORDER — THERA TABS 400 MCG
1 TAB ORAL DAILY
Status: DISCONTINUED | OUTPATIENT
Start: 2020-10-22 | End: 2020-10-22 | Stop reason: HOSPADM

## 2020-10-22 RX ADMIN — Medication 10 ML: at 05:01

## 2020-10-22 RX ADMIN — MORPHINE SULFATE 2 MG: 2 INJECTION, SOLUTION INTRAMUSCULAR; INTRAVENOUS at 04:23

## 2020-10-22 RX ADMIN — FOLIC ACID 1 MG: 1 TABLET ORAL at 09:51

## 2020-10-22 RX ADMIN — OXYCODONE 5 MG: 5 TABLET ORAL at 09:51

## 2020-10-22 RX ADMIN — Medication 100 MG: at 09:51

## 2020-10-22 RX ADMIN — THERA TABS 1 TABLET: TAB at 09:51

## 2020-10-22 NOTE — PROGRESS NOTES
Patient will discharge home today, cm provided her with financial assistance application.        Angelika Davison [facility]

## 2020-10-22 NOTE — CONSULTS
[address] [facility] MD Boo, Jacques Dumont MD, MPH      Zuleyma Winchester, KELI Pryor, [facility]     Klarissa Peacock, [facility]   AZRA Sumner, [facility]       Ozzie Guido [facility] [address]    at [address], [address] [place] [address], Elisei  22.    [phone]    FAX: [address] [place] [address], [address]    [phone]    FAX: [phone]       HEPATOLOGY CONSULT NOTE    I was asked to see this patient in consultation by Gabi Vargas NP for management of alcohol hepatitis, HCV infection. I have reviewed the Emergency room note, Hospital admission note,  Notes by all other physicians who have seen the patient during this hospitalization to date. I have reviewed the problem list and the reason for this hospitalization. I have reviewed the allergies and the medications the patient was taking at home prior to this hospitalization. HISTORY:  Maurice Broussard is a 38year old female who presented to the ER on 10/21 due to severe epigastric pain, nausea and vomiting and low appetite. She was admitted for acute alcoholic pancreatitis. Blood work performed on admission showed wbc 5.5, Hb 9.4, PLT 97, BUN 2, Scr 0.37, ALT 79, , ALP 99, lipase 1,624. Acute hepatitis panel positive for HCV Ab  CT scan of the abdomen/pelvis WO contrast performed 10/20/20 demonstrates compatible with acute pancreatitis with inflammatory changes extending into the mesentery. Hepatic steatosis. MRI/MRCP WO contrast shows acute pancreatitis. Profound hepatic steatosis  Patient is on folic acid and thiamine    Patient drinks and average of 1 bottle of wine daily for about a year. Last drink was the day prior to admission. She inhales cocaine occasionally and she has done that since her 18's. Patient reports abdominal pain is subsiding now rates as a 4-5/10 in severity. No jaundice, hematemesis or hematocehiza. 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 The patient has consumed about 1 bottle of wine daily and maybe more  Discussed the need to stop using alcohol for 3 months and then recheck liver enzymes. Suspect the liver enzymes will decline into the normal range with 3 months of abstinence. Alcohol hepatitis  There is elevation in liver transaminases in a pattern consistent with alcohol. There is an elevation in TBILI to 1.2 on admission  INR has not been check since 10/20. The DF is under 32. The alcoholic hepatitis is mild and does not require steroids. Hepatitis C Ab positive  Patient is positive for HCV Ab. This is likely due to chronic hepatitis C virus infection. We will check hepatitis C RNA and Genotype. This can be done on an outpatient basis  Recommend patient follow up with me in the clinic for hepatitis C virus treatment    Alcohol abuse:  Recommend patient enrol in alcohol relapse counseling programe  Consult case management for help enrolling patient with medicaid    Acute Alcoholic pancreatitis  Management as per primary team  p.o. twice daily    Health maintenance:  -Hepatitis A virus vaccination:  -Hepatitis B virus vaccination: We will check Hepatitis B core total Ab and hepatitis B S Ab to determine immunity    -Hepatology follow-up appointment: Follow up with hepatology within 2 weeks of discharge    Thank you for this consult. Please do not hesitate to call with questions or concerns        SYSTEM REVIEW:    Constitution systems: Negative for fever, chills, weight gain, weight loss. Eyes: Negative for visual changes. ENT: Negative for sore throat, painful swallowing. Respiratory: Negative for cough, hemoptysis, SOB. Cardiology: Negative for chest pain, palpitations. GI:  Negative for constipation or diarrhea. : Negative for urinary frequency, dysuria, hematuria, nocturia. Skin: Negative for rash. Hematology: Negative for easy bruising, blood clots. Musculo-skelatal: Negative for back pain, muscle pain, weakness. Neurologic: Negative for headaches, dizziness, vertigo, memory problems not related to HE. Psychology: Negative for anxiety, depression. FAMILY HISTORY:  The father is alive  The patient has no knowledge of the father's medical condition. The mother .  of brain cancer  There is no family history of liver disease. There is no family history of immune disorders. SOCIAL HISTORY:  The patient is single  The patient has no children. The patient does not smoke  The patient consumes alcohol in excess. She drinks about 1 bottle of wine daily  Patient inhales cocaine occasionally  The patient currently works full time as a [employer].        PHYSICAL EXAMINATION:  VS: per nursing note  General:    No acute distress. Ill appearing  Eyes:    Sclera anicteric. ENT:  No oral lesions. Thyroid normal.  Nodes:  No adenopathy. Skin:  No spider angiomata. No jaundice. No palmar erythema. Respiratory:    Lungs clear to auscultation. Cardiovascular:  Regular heart rate. Abdomen:  Soft non-tender,   No rebound. Liver size normal to percussion/palpation. Spleen not palpable. No  ascites. .  Extremities: No lower extremity edema. Neurologic: Alert and oriented. Cranial nerves grossly intact. No asterixis. LABORATORY:  Results for Gurdeep Holiday (MRN [id number]) as of 10/22/2020 10:06   Ref.  Range 10/21/2020 04:06 10/22/2020 04:35   WBC Latest Ref Range: 3.6 - 11.0 K/uL 5.8 5.5   HGB Latest Ref Range: 11.5 - 16.0 g/dL 9.3 (L) 9.4 (L)   PLATELET Latest Ref Range: 150 - 400 K/uL 104 (L) 97 (L)   Sodium Latest Ref Range: 136 - 145 mmol/L 135 (L) 141   Potassium Latest Ref Range: 3.5 - 5.1 mmol/L 3.7 3.4 (L)   BUN Latest Ref Range: 6 - 20 MG/DL 6 2 (L)   Creatinine Latest Ref Range: 0.55 - 1.02 MG/DL 0.40 (L) 0.37 (L)   Bilirubin, total Latest Ref Range: 0.2 - 1.0 MG/DL 1.3 (H) 1.0   Protein, total Latest Ref Range: 6.4 - 8.2 g/dL 4.8 (L) 5.3 (L)   Albumin Latest Ref Range: 3.5 - 5.0 g/dL 2.7 (L) 2.8 (L)   ALT Latest Ref Range: 12 - 78 U/L 89 (H) 79 (H)   AST Latest Ref Range: 15 - 37 U/L 149 (H) 132 (H)   Alk. phosphatase Latest Ref Range: 45 - 117 U/L 84 99   Results for Nicolas Bean (MRN [id number]) as of 10/22/2020 10:06   Ref. Range 10/20/2020 10:17   INR Latest Ref Range: 0.9 - 1.1   1.2 (H)   Prothrombin time Latest Ref Range: 9.0 - 11.1 sec 12.2 (H)       RADIOLOGY:  10/20: MRI/MRCP Abdomen: 1. Acute pancreatitis with extensive peripancreatic stranding extending around  the duodenum and into the pararenal spaces bilaterally with mild duodenal wall  thickening and T2 signal elevation likely reflects secondary inflammation. No  other combinations of pancreatitis. 2. Profound hepatic steatosis with swollen appearance to liver suggestive of  steatohepatitis.     Tim Guillen MD, MPH  Advanced Hepatology  [facility] 13 of [address] [address] [facility], [address] [facility] [address].  [phone]  [address] [facility]

## 2020-10-22 NOTE — PROGRESS NOTES
Problem: General Medical Care Plan  Goal: *Vital signs within specified parameters  Outcome: Resolved/Not Met  Goal: *Labs within defined limits  Outcome: Resolved/Not Met  Goal: *Absence of infection signs and symptoms  Outcome: Resolved/Not Met  Goal: *Optimal pain control at patient's stated goal  Outcome: Resolved/Not Met  Goal: *Skin integrity maintained  Outcome: Resolved/Not Met  Goal: *Fluid volume balance  Outcome: Resolved/Not Met  Goal: *Optimize nutritional status  Outcome: Resolved/Not Met  Goal: *Anxiety reduced or absent  Outcome: Resolved/Not Met  Goal: *Progressive mobility and function (eg: ADL's)  Outcome: Resolved/Not Met     Problem: Patient Education: Go to Patient Education Activity  Goal: Patient/Family Education  Outcome: Resolved/Not Met     Problem: Pain  Goal: *Control of Pain  Outcome: Resolved/Not Met  Goal: *PALLIATIVE CARE:  Alleviation of Pain  Outcome: Resolved/Not Met     Problem: Patient Education: Go to Patient Education Activity  Goal: Patient/Family Education  Outcome: Resolved/Not Met     Problem: Discharge Planning  Goal: *Discharge to safe environment  Outcome: Resolved/Not Met     Problem: Nutrition Deficit  Goal: *Optimize nutritional status  Outcome: Resolved/Not Met     Problem: General Medical Care Plan  Goal: *Vital signs within specified parameters  Outcome: Resolved/Not Met  Goal: *Labs within defined limits  Outcome: Resolved/Not Met  Goal: *Absence of infection signs and symptoms  Outcome: Resolved/Not Met  Goal: *Optimal pain control at patient's stated goal  Outcome: Resolved/Not Met  Goal: *Skin integrity maintained  Outcome: Resolved/Not Met  Goal: *Fluid volume balance  Outcome: Resolved/Not Met  Goal: *Optimize nutritional status  Outcome: Resolved/Not Met  Goal: *Anxiety reduced or absent  Outcome: Resolved/Not Met  Goal: *Progressive mobility and function (eg: ADL's)  Outcome: Resolved/Not Met     Problem: Patient Education: Go to Patient Education Activity  Goal: Patient/Family Education  Outcome: Resolved/Not Met     Problem: Pain  Goal: *Control of Pain  Outcome: Resolved/Not Met  Goal: *PALLIATIVE CARE:  Alleviation of Pain  Outcome: Resolved/Not Met     Problem: Patient Education: Go to Patient Education Activity  Goal: Patient/Family Education  Outcome: Resolved/Not Met     Problem: Discharge Planning  Goal: *Discharge to safe environment  Outcome: Resolved/Not Met     Problem: Nutrition Deficit  Goal: *Optimize nutritional status  Outcome: Resolved/Not Met

## 2020-10-22 NOTE — PROGRESS NOTES
Hospitalist Progress Note          Raad Marrufo NP  Please call  and page for questions. Call physician on-call through the  7pm-7am    Daily Progress Note: 10/21/2020    Primary care provider:Rickey Rosas MD    Date of admission: 10/20/2020 10:22 AM    Admission Summary and Hospital Course:      From H&P 10/20/20:  \" Rebecca Junior is a 38 y.o. female who presents with abdominal pain  History is primary obtained from the patient  Patient reports that she started experiencing significant epigastric pain associated with nausea, vomiting, poor appetite and discomfort that started last night. Patient reports the pain was in the epigastric region, radiated throughout her abdomen, got worse while laying down, and she had multiple bouts of nonbilious nonbloody vomiting associated with the same. Patient reports that she drinks about a bottle of wine on a daily basis. Patient reports that she has never had pancreatitis before. Patient came to the ER was diagnosed with pancreatitis and was requested to be admitted under the hospitalist service. Patient reports that her last alcoholic drink was yesterday night. Patient denies any other complaints or problems. Denies any exposure to anyone diagnosed with COVID-19 recently  The patient denies any Headache, blurry vision, sore throat, trouble swallowing, trouble with speech, chest pain, SOB, cough, fever, chills urinary symptoms, constipation, recent travels, sick contacts, focal or generalized neurological symptoms,  falls, injuries, rashes, contact with COVID-19 diagnosed patients, hematemesis, melena, hemoptysis, hematuria, rashes, denies starting any new medications and denies any other concerns or problems besides as mentioned above. \"      Subjective:   Pt seen today in NAD with her boyfriend at the bedside. I requested the boyfriend step out of the room and discussed current diagnostic test results and labs. Discussed reactive hep C ab. Patient was very surprised to learn this and denies any previous knowledge of Hep C. She denies any IVDA currently or in the past. Admits to occasional cocaine use. Abd remains tender to palpation but pain has improved and patient is tolerating CLD and requesting regular diet. denies HA, dizziness, visual changes, cough, SOB, CP, n/v/d, dysuria or weakness.      Assessment/Plan:       Acute pancreatitis: Likely alcohol-related  -Improving  -Cont IVF  -Cont IV morphine; plan to transition to PO tomorrow  -MRCP 10/21: acute panc w/ extensive peripancreatic stranding extending around duodenum and into pararenal spaces bilaterally w/ mild duodenal wall thickening and T2 signal elevation; Profound hepatic steatosis w/ swollen appearance to liver suggestive of steatohepatitis  -Advance diet to regular; return to CLD if pain increases  -Monitor lipase daily      Profound hepatic steatosis: w/ steatohepatitis  -Acute hep panel w/ reactive Hep C ab  -Consult hepatology in AM    Alcohol abuse:  Daily drinker, serum ethyl POA 54  -counseled for cessation  -CIWA monitoring  -seizure precautions    Hyponatremia  -suspect d/t above and poor PO intake  -IVF hydration  -Monitor labs    Anemia  -Unclear etiology  -denies being on period  -check iron profile    Thrombocytopenia  -suspect d/t etoh/liver disease  -Monitor labs     Lactic acidosis: Likely alcohol-related  -Resolved     Elevated LFTs and bilirubin: Likely alcoholic liver disease  -Improving  -MRCP 10/21: acute panc w/ extensive peripancreatic stranding extending around duodenum and into pararenal spaces bilaterally w/ mild duodenal wall thickening and T2 signal elevation; Profound hepatic steatosis w/ swollen appearance to liver suggestive of steatohepatitis  -Acute hep panel w/ reactive Hep C ab  -hepatology consult in AM    Substance Abuse  -UDS + for cocaine  -counseled for cessation  -UDS also pos for opiates but pt received IV morphine several hours prior test    Abnormal UA  -2+ bacteria, trace leukocytes  -cont ceftriaxone for now pending urine culture results    D/C cardiac monitoring; downgrade to m/s    DVTppx: SCDs  Gippx:NA  Code Status: Full code  Diet: Regular  Activity: OOB to chair TID and PRN  Discharge: next 24-48h         Review of Systems:     Full ROS complete with pertinent positives and negatives as per HPI, otherwise negative    Objective:   Physical Exam:     Visit Vitals  /65 (BP 1 Location: Right arm, BP Patient Position: At rest)   Pulse 100   Temp 98.4 °F (36.9 °C)   Resp 16   Ht 5' 6\" (1.676 m)   Wt 55 kg (121 lb 4.1 oz)   LMP 2020   SpO2 96%   Breastfeeding No   BMI 19.69 kg/m²      O2 Device: Room air    Temp (24hrs), Av °F (37.2 °C), Min:98.4 °F (36.9 °C), Max:99.4 °F (37.4 °C)    No intake/output data recorded. No intake/output data recorded. General:  Alert, cooperative, no distress, appears stated age. Lungs:   Clear to auscultation bilaterally. Heart:  Regular rate and rhythm, S1, S2 normal, no murmur, click, rub or gallop. Abdomen:   Soft, slightly TTP diffusely, non-distended. Bowel sounds normal.    Extremities: Extremities normal, atraumatic, no cyanosis or edema. Skin: Skin color, texture, turgor normal. No rashes or lesions   Neurologic: CNII-XII intact, A&Ox4, SOLIS     Data Review:       Recent Days:  Recent Labs     10/21/20  0406 10/20/20  1017   WBC 5.8 11.7*   HGB 9.3* 13.0   HCT 27.7* 39.2   * 216     Recent Labs     10/21/20  0406 10/20/20  1801 10/20/20  1017   *  --  136   K 3.7  --  4.1     --  98   CO2 24  --  21   GLU 87  --  97   BUN 6  --  7   CREA 0.40*  --  0.76   CA 8.1*  --  9.3   MG  --  1.8 1.9   PHOS  --  4.2 4.6   ALB 2.7*  --  4.2   ALT 89*  --  185*   INR  --   --  1.2*     No results for input(s): PH, PCO2, PO2, HCO3, FIO2 in the last 72 hours.     24 Hour Results:  Recent Results (from the past 24 hour(s))   TROPONIN I    Collection Time: 10/21/20  4:06 AM   Result Value Ref Range    Troponin-I, Qt. <0.05 <1.00 ng/mL   METABOLIC PANEL, COMPREHENSIVE    Collection Time: 10/21/20  4:06 AM   Result Value Ref Range    Sodium 135 (L) 136 - 145 mmol/L    Potassium 3.7 3.5 - 5.1 mmol/L    Chloride 104 97 - 108 mmol/L    CO2 24 21 - 32 mmol/L    Anion gap 7 5 - 15 mmol/L    Glucose 87 65 - 100 mg/dL    BUN 6 6 - 20 MG/DL    Creatinine 0.40 (L) 0.55 - 1.02 MG/DL    BUN/Creatinine ratio 15 12 - 20      GFR est AA >60 >60 ml/min/1.73m2    GFR est non-AA >60 >60 ml/min/1.73m2    Calcium 8.1 (L) 8.5 - 10.1 MG/DL    Bilirubin, total 1.3 (H) 0.2 - 1.0 MG/DL    ALT (SGPT) 89 (H) 12 - 78 U/L    AST (SGOT) 149 (H) 15 - 37 U/L    Alk. phosphatase 84 45 - 117 U/L    Protein, total 4.8 (L) 6.4 - 8.2 g/dL    Albumin 2.7 (L) 3.5 - 5.0 g/dL    Globulin 2.1 2.0 - 4.0 g/dL    A-G Ratio 1.3 1.1 - 2.2     CBC WITH AUTOMATED DIFF    Collection Time: 10/21/20  4:06 AM   Result Value Ref Range    WBC 5.8 3.6 - 11.0 K/uL    RBC 2.57 (L) 3.80 - 5.20 M/uL    HGB 9.3 (L) 11.5 - 16.0 g/dL    HCT 27.7 (L) 35.0 - 47.0 %    .8 (H) 80.0 - 99.0 FL    MCH 36.2 (H) 26.0 - 34.0 PG    MCHC 33.6 30.0 - 36.5 g/dL    RDW 13.6 11.5 - 14.5 %    PLATELET 988 (L) 703 - 400 K/uL    MPV 10.3 8.9 - 12.9 FL    NRBC 0.0 0  WBC    ABSOLUTE NRBC 0.00 0.00 - 0.01 K/uL    NEUTROPHILS 71 32 - 75 %    LYMPHOCYTES 15 12 - 49 %    MONOCYTES 13 5 - 13 %    EOSINOPHILS 1 0 - 7 %    BASOPHILS 0 0 - 1 %    IMMATURE GRANULOCYTES 1 (H) 0.0 - 0.5 %    ABS. NEUTROPHILS 4.1 1.8 - 8.0 K/UL    ABS. LYMPHOCYTES 0.9 0.8 - 3.5 K/UL    ABS. MONOCYTES 0.8 0.0 - 1.0 K/UL    ABS. EOSINOPHILS 0.0 0.0 - 0.4 K/UL    ABS. BASOPHILS 0.0 0.0 - 0.1 K/UL    ABS. IMM.  GRANS. 0.0 0.00 - 0.04 K/UL    DF AUTOMATED     LACTIC ACID    Collection Time: 10/21/20  4:06 AM   Result Value Ref Range    Lactic acid 1.3 0.4 - 2.0 MMOL/L   LIPASE    Collection Time: 10/21/20  4:06 AM   Result Value Ref Range    Lipase 1,624 (H) 73 - 393 U/L       Problem List:  Problem List as of 10/21/2020 Never Reviewed          Codes Class Noted - Resolved    Severe protein-calorie malnutrition ([facility] [address] ICD-10-CM: E43  ICD-9-CM: 262  10/21/2020 - Present        Acute pancreatitis ICD-10-CM: K85.90  ICD-9-CM: 278.0  10/20/2020 - Present              Medications reviewed  Current Facility-Administered Medications   Medication Dose Route Frequency    sodium chloride (NS) flush 5-40 mL  5-40 mL IntraVENous Q8H    sodium chloride (NS) flush 5-40 mL  5-40 mL IntraVENous PRN    lactated Ringers infusion  150 mL/hr IntraVENous CONTINUOUS    cefTRIAXone (ROCEPHIN) 1 g in 0.9% sodium chloride (MBP/ADV) 50 mL  1 g IntraVENous Q24H    LORazepam (ATIVAN) injection 1 mg  1 mg IntraVENous Q4H PRN    0.9% sodium chloride 8,171 mL with folic acid 1 mg, thiamine 100 mg, mvi, adult no. 4 with vit K 10 mL infusion   IntraVENous DAILY    morphine injection 2 mg  2 mg IntraVENous Q4H PRN       Care Plan discussed with: Patient/family, nurse      Mango Chery NP  Hospitalist  Providers can reach me on [employer]

## 2020-10-22 NOTE — PROGRESS NOTES
Problem: Pain  Goal: *Control of Pain  Outcome: Progressing Towards Goal     Problem: Nutrition Deficit  Goal: *Optimize nutritional status  Outcome: Progressing Towards Goal

## 2020-10-22 NOTE — PROGRESS NOTES
Problem: General Medical Care Plan  Goal: *Vital signs within specified parameters  Outcome: Resolved/Not Met  Goal: *Labs within defined limits  Outcome: Resolved/Not Met  Goal: *Absence of infection signs and symptoms  Outcome: Resolved/Not Met  Goal: *Optimal pain control at patient's stated goal  Outcome: Resolved/Not Met  Goal: *Skin integrity maintained  Outcome: Resolved/Not Met  Goal: *Fluid volume balance  Outcome: Resolved/Not Met  Goal: *Optimize nutritional status  Outcome: Resolved/Not Met  Goal: *Anxiety reduced or absent  Outcome: Resolved/Not Met  Goal: *Progressive mobility and function (eg: ADL's)  Outcome: Resolved/Not Met     Problem: Patient Education: Go to Patient Education Activity  Goal: Patient/Family Education  Outcome: Resolved/Not Met     Problem: Pain  Goal: *Control of Pain  Outcome: Resolved/Not Met  Goal: *PALLIATIVE CARE:  Alleviation of Pain  Outcome: Resolved/Not Met     Problem: Patient Education: Go to Patient Education Activity  Goal: Patient/Family Education  Outcome: Resolved/Not Met     Problem: Discharge Planning  Goal: *Discharge to safe environment  Outcome: Resolved/Not Met     Problem: Nutrition Deficit  Goal: *Optimize nutritional status  Outcome: Resolved/Not Met

## 2020-10-22 NOTE — PROGRESS NOTES
Problem: General Medical Care Plan  Goal: *Vital signs within specified parameters  Outcome: Resolved/Not Met     Problem: General Medical Care Plan  Goal: *Labs within defined limits  Outcome: Resolved/Not Met     Problem: General Medical Care Plan  Goal: *Absence of infection signs and symptoms  Outcome: Resolved/Not Met     Problem: General Medical Care Plan  Goal: *Optimal pain control at patient's stated goal  Outcome: Resolved/Not Met     Problem: General Medical Care Plan  Goal: *Skin integrity maintained  Outcome: Resolved/Not Met     Problem: General Medical Care Plan  Goal: *Fluid volume balance  Outcome: Resolved/Not Met     Problem: General Medical Care Plan  Goal: *Optimize nutritional status  Outcome: Resolved/Not Met     Problem: General Medical Care Plan  Goal: *Anxiety reduced or absent  Outcome: Resolved/Not Met     Problem: General Medical Care Plan  Goal: *Progressive mobility and function (eg: ADL's)  Outcome: Resolved/Not Met     Problem: General Medical Care Plan  Goal: *Progressive mobility and function (eg: ADL's)  Outcome: Resolved/Not Met     Problem: Patient Education: Go to Patient Education Activity  Goal: Patient/Family Education  Outcome: Resolved/Not Met     Problem: Pain  Goal: *Control of Pain  Outcome: Resolved/Not Met     Problem: Discharge Planning  Goal: *Discharge to safe environment  Outcome: Resolved/Not Met Patient/Caregiver provided printed discharge information.

## 2020-10-22 NOTE — PROGRESS NOTES
Problem: General Medical Care Plan  Goal: *Vital signs within specified parameters  Outcome: Resolved/Not Met  Goal: *Labs within defined limits  Outcome: Resolved/Not Met  Goal: *Absence of infection signs and symptoms  Outcome: Resolved/Not Met  Goal: *Optimal pain control at patient's stated goal  Outcome: Resolved/Not Met  Goal: *Skin integrity maintained  Outcome: Resolved/Not Met  Goal: *Fluid volume balance  Outcome: Resolved/Not Met  Goal: *Optimize nutritional status  Outcome: Resolved/Not Met  Goal: *Anxiety reduced or absent  Outcome: Resolved/Not Met  Goal: *Progressive mobility and function (eg: ADL's)  Outcome: Resolved/Not Met     Problem: Patient Education: Go to Patient Education Activity  Goal: Patient/Family Education  Outcome: Resolved/Not Met     Problem: Pain  Goal: *Control of Pain  Outcome: Resolved/Not Met  Goal: *PALLIATIVE CARE:  Alleviation of Pain  Outcome: Resolved/Not Met     Problem: Patient Education: Go to Patient Education Activity  Goal: Patient/Family Education  Outcome: Resolved/Not Met     Problem: Discharge Planning  Goal: *Discharge to safe environment  Outcome: Resolved/Not Met     Problem: Nutrition Deficit  Goal: *Optimize nutritional status  Outcome: Resolved/Not Met     Problem: General Medical Care Plan  Goal: *Vital signs within specified parameters  Outcome: Resolved/Not Met  Goal: *Labs within defined limits  Outcome: Resolved/Not Met  Goal: *Absence of infection signs and symptoms  Outcome: Resolved/Not Met  Goal: *Optimal pain control at patient's stated goal  Outcome: Resolved/Not Met  Goal: *Skin integrity maintained  Outcome: Resolved/Not Met  Goal: *Fluid volume balance  Outcome: Resolved/Not Met  Goal: *Optimize nutritional status  Outcome: Resolved/Not Met  Goal: *Anxiety reduced or absent  Outcome: Resolved/Not Met  Goal: *Progressive mobility and function (eg: ADL's)  Outcome: Resolved/Not Met     Problem: Patient Education: Go to Patient Education Activity  Goal: Patient/Family Education  Outcome: Resolved/Not Met     Problem: Pain  Goal: *Control of Pain  Outcome: Resolved/Not Met  Goal: *PALLIATIVE CARE:  Alleviation of Pain  Outcome: Resolved/Not Met     Problem: Patient Education: Go to Patient Education Activity  Goal: Patient/Family Education  Outcome: Resolved/Not Met     Problem: Discharge Planning  Goal: *Discharge to safe environment  Outcome: Resolved/Not Met     Problem: Nutrition Deficit  Goal: *Optimize nutritional status  Outcome: Resolved/Not Met     Problem: General Medical Care Plan  Goal: *Vital signs within specified parameters  Outcome: Resolved/Not Met  Goal: *Labs within defined limits  Outcome: Resolved/Not Met  Goal: *Absence of infection signs and symptoms  Outcome: Resolved/Not Met  Goal: *Optimal pain control at patient's stated goal  Outcome: Resolved/Not Met  Goal: *Skin integrity maintained  Outcome: Resolved/Not Met  Goal: *Fluid volume balance  Outcome: Resolved/Not Met  Goal: *Optimize nutritional status  Outcome: Resolved/Not Met  Goal: *Anxiety reduced or absent  Outcome: Resolved/Not Met  Goal: *Progressive mobility and function (eg: ADL's)  Outcome: Resolved/Not Met     Problem: Patient Education: Go to Patient Education Activity  Goal: Patient/Family Education  Outcome: Resolved/Not Met     Problem: Pain  Goal: *Control of Pain  Outcome: Resolved/Not Met  Goal: *PALLIATIVE CARE:  Alleviation of Pain  Outcome: Resolved/Not Met     Problem: Patient Education: Go to Patient Education Activity  Goal: Patient/Family Education  Outcome: Resolved/Not Met     Problem: Discharge Planning  Goal: *Discharge to safe environment  Outcome: Resolved/Not Met     Problem: Nutrition Deficit  Goal: *Optimize nutritional status  Outcome: Resolved/Not Met

## 2020-10-22 NOTE — PROGRESS NOTES
Bedside shift change report given to Mehreen Poon RN (oncoming nurse) by Joane Fabry RN (offgoing nurse). Report included the following information SBAR, MAR and Recent Results.

## 2020-10-22 NOTE — DISCHARGE INSTRUCTIONS
Discharge Instructions       PATIENT ID: Rowena Koch  MRN: [id number]   YOB: 1980    DATE OF ADMISSION: 10/20/2020 10:22 AM    DATE OF DISCHARGE: 10/22/2020    PRIMARY CARE PROVIDER: Lexie Bui MD     ATTENDING PHYSICIAN: Misael Phelps MD  DISCHARGING PROVIDER: Rhiannon Angel NP    To contact this individual call [phone] and ask the  to page. If unavailable ask to be transferred the Adult Hospitalist Department. DISCHARGE DIAGNOSES Alcoholic pancreatitis, hepatitis C, fatty liver, elevated liver enzymes, elevated bilirubin, hypokalemia, anemia, abnormal urinalysis, cocaine abuse, alcohol abuse and acute intoxication    CONSULTATIONS: IP CONSULT TO HOSPITALIST  IP CONSULT TO HEPATOLOGY    PROCEDURES/SURGERIES: * No surgery found *    PENDING TEST RESULTS:   At the time of discharge the following test results are still pending: Final urine culture    FOLLOW UP APPOINTMENTS:   Follow-up Information     Follow up With Specialties Details Why Contact Info    Lexie Bui MD Internal Medicine Schedule an appointment as soon as possible for a visit Hospital follow up, elevated cholesterol level, repeat chemistry labs to ensure resolution of elevated liver enzymes and lipase level [address]      Trang Chaidez MD Hepatology Schedule an appointment as soon as possible for a visit Hepatitis C, fatty liver, elevated liver enzymes and lipase [address] [address]  [phone]             ADDITIONAL CARE RECOMMENDATIONS: It is important that you stop using alcohol and recreational drugs such as cocaine. If your symptoms return please call your physician or come to the emergency department. If you start having symptoms of urinary tract infection, please see your primary care physician.   It is imperative to follow up as instructed for further assessment and management of your acute and chronic health conditions. Please also read the attached educational handouts as they contain important information and instructions about your care. DIET: Regular    ACTIVITY: As tolerated    WOUND CARE: NA    EQUIPMENT needed: NA      DISCHARGE MEDICATIONS:   See Medication Reconciliation Form    · It is important that you take the medication exactly as they are prescribed. · Keep your medication in the bottles provided by the pharmacist and keep a list of the medication names, dosages, and times to be taken in your wallet. · Do not take other medications without consulting your doctor. NOTIFY YOUR PHYSICIAN FOR ANY OF THE FOLLOWING:   Fever over 101 degrees for 24 hours. Chest pain, shortness of breath, fever, chills, nausea, vomiting, diarrhea, change in mentation, falling, weakness, bleeding. Severe pain or pain not relieved by medications. Or, any other signs or symptoms that you may have questions about. DISPOSITION:  X  Home With:   OT  PT  AG  RN       SNF/Inpatient Rehab/LTAC    Independent/assisted living    Hospice    Other:     CDMP Checked: Yes X     PROBLEM LIST Updated:   Yes X       Signed:   Marion Melgar NP  10/22/2020  12:05 PM

## 2020-10-24 NOTE — DISCHARGE SUMMARY
Discharge Summary       PATIENT ID: David Adkins  MRN: [id number]   YOB: 1980    DATE OF ADMISSION: 10/20/2020 10:22 AM    DATE OF DISCHARGE: 10/24/20  PRIMARY CARE PROVIDER: Pramod Jimenez MD     ATTENDING PHYSICIAN: Dr. Nancy Larson  DISCHARGING PROVIDER: Quincy Escobedo NP    To contact this individual call [phone] and ask the  to page. If unavailable ask to be transferred the Adult Hospitalist Department.     CONSULTATIONS: IP CONSULT TO HEPATOLOGY    PROCEDURES/SURGERIES: * No surgery found *    [address] COURSE:     Acute alcoholic pancreatitis  Alcohol abuse with acute intoxication  Lactic Acidosis  Elevated LFTs    From H&P 10/20/20:  \" Blanca Manuel is a 38 y.o. female who presents with abdominal pain  History is primary obtained from the patient  Patient reports that she started experiencing significant epigastric pain associated with nausea, vomiting, poor appetite and discomfort that started last night.  Patient reports the pain was in the epigastric region, radiated throughout her abdomen, got worse while laying down, and she had multiple bouts of nonbilious nonbloody vomiting associated with the same.  Patient reports that she drinks about a bottle of wine on a daily basis. Ori Garcia reports that she has never had pancreatitis before. Ori Garcia came to the ER was diagnosed with pancreatitis and was requested to be admitted under the hospitalist service. Ori Garcia reports that her last alcoholic drink was yesterday night.  Patient denies any other complaints or problems.  Denies any exposure to anyone diagnosed with COVID-19 recently  The patient denies any Headache, blurry vision, sore throat, trouble swallowing, trouble with speech, chest pain, SOB, cough, fever, chills urinary symptoms, constipation, recent travels, sick contacts, focal or generalized neurological symptoms,  falls, injuries, rashes, contact with COVID-19 diagnosed patients, hematemesis, melena, hemoptysis, hematuria, rashes, denies starting any new medications and denies any other concerns or problems besides as mentioned above. 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 The patient has consumed about 1 bottle of wine daily and maybe more  Discussed the need to stop using alcohol for 3 months and then recheck liver enzymes. Suspect the liver enzymes will decline into the normal range with 3 months of abstinence. Alcohol hepatitis  There is elevation in liver transaminases in a pattern consistent with alcohol. There is an elevation in TBILI to 1.2 on admission  INR has not been check since 10/20. The DF is under 32. The alcoholic hepatitis is mild and does not require steroids. Hepatitis C Ab positive  Patient is positive for HCV Ab. This is likely due to chronic hepatitis C virus infection. We will check hepatitis C RNA and Genotype. This can be done on an outpatient basis  Recommend patient follow up with me in the clinic for hepatitis C virus treatment  Alcohol abuse:  Recommend patient enrol in alcohol relapse counseling programe  Consult case management for help enrolling patient with medicaid  Acute Alcoholic pancreatitis  Management as per primary team  p.o. twice daily  Health maintenance:  -Hepatitis A virus vaccination:  -Hepatitis B virus vaccination: We will check Hepatitis B core total Ab and hepatitis B S Ab to determine immunity  -Hepatology follow-up appointment: Follow up with hepatology within 2 weeks of discharge\"     Patient seen today in [facility]. She is walking around, already dressed for discharge and reports tolerating PO diet without n/v or increased pain. Still having some abdominal pain, which is well controlled with PRNs. Pt is being discharged today.  Discussed discharge with patient, specifically d/c instructions, recommendations, follow ups and medications. All questions answered and patient verbalized understanding. At this time the patient denies HA, dizziness, visual disturbance, cough, CP, SOB, n/v/d, dysuria or weakness. Discussed with Dr. Biggs Session.       DISCHARGE DIAGNOSES / PLAN:      Acute pancreatitis: Likely alcohol-related  -Resolving  -MRCP 10/21: acute panc w/ extensive peripancreatic stranding extending around duodenum and into pararenal spaces bilaterally w/ mild duodenal wall thickening and T2 signal elevation; Profound hepatic steatosis w/ swollen appearance to liver suggestive of steatohepatitis  -PO Kimberly PRN for pain  -f/u with PCP      Profound hepatic steatosis: w/ steatohepatitis  -Stable  -Acute hep panel w/ reactive Hep C ab  -Seen by Hepatology; f/u as instructed     Alcohol abuse:  Daily drinker, serum ethyl POA 54  -counseled for cessation  -negative CIWA scores now  -f/u with PCP     Hyponatremia  -Resolved     Anemia  -Stable  -Unclear etiology; reports some blood in urine recently  -denies being on period  -Iron profile NL with only slightly low TIBC  -f/u with PCP for further workup     Thrombocytopenia  -no s/sx bleed  -suspect d/t etoh/liver disease  -f/u with PCP for repeat labs  -f/u with hepatology     Lactic acidosis: Likely alcohol-related  -Resolved     Elevated LFTs and bilirubin: Likely alcoholic liver disease  -Improving  -MRCP 10/21: acute panc w/ extensive peripancreatic stranding extending around duodenum and into pararenal spaces bilaterally w/ mild duodenal wall thickening and T2 signal elevation; Profound hepatic steatosis w/ swollen appearance to liver suggestive of steatohepatitis  -Acute hep panel w/ reactive Hep C ab  -f/u with PCP for repeat labs  -f/u with hepatology     Substance Abuse  -UDS + for cocaine   -counseled for cessation     Abnormal UA  -2+ bacteria, trace leukocytes  -treated with ceftriaxone as IP  -BC neg; urine culture w/ only beta hemolytic group B strep  -Pt asymptomatic, afebrile and no leukocytosis so abx will not be continued at discharge  -f/u with PCP        ADDITIONAL CARE RECOMMENDATIONS:   It is important that you stop using alcohol and recreational drugs such as cocaine. If your symptoms return please call your physician or come to the emergency department. If you start having symptoms of urinary tract infection, please see your primary care physician. It is imperative to follow up as instructed for further assessment and management of your acute and chronic health conditions. Please also read the attached educational handouts as they contain important information and instructions about your care.       PENDING TEST RESULTS:   At the time of discharge the following test results are still pending: NA    FOLLOW UP APPOINTMENTS:    Follow-up Information     Follow up With Specialties Details Why Contact Info    Damian Sethi MD Internal Medicine Schedule an appointment as soon as possible for a visit Hospital follow up, elevated cholesterol level, repeat chemistry labs to ensure resolution of elevated liver enzymes and lipase level [address]      Richard Carreon MD Hepatology Schedule an appointment as soon as possible for a visit Hepatitis C, fatty liver, elevated liver enzymes and lipase [address] [address]  [phone]             DIET: Regular    ACTIVITY: As tolerated    WOUND CARE: NA    EQUIPMENT needed: NA      DISCHARGE MEDICATIONS:  Discharge Medication List as of 10/22/2020  1:02 PM      START taking these medications    Details   folic acid (FOLVITE) 1 mg tablet Take 1 Tab by mouth daily. , Print, Disp-30 Tab,R-0      therapeutic multivitamin (THERAGRAN) tablet Take 1 Tab by mouth daily. , Print, Disp-30 Tab,R-0      thiamine HCL (B-1) 100 mg tablet Take 1 Tab by mouth daily. , Print, Disp-30 Tab,R-0      oxyCODONE IR (ROXICODONE) 5 mg immediate release tablet Take 1 Tab by mouth every eight (8) hours as needed for Pain for up to 2 days. Max Daily Amount: 15 mg., Print, Disp-6 Tab,R-0      naloxone (Narcan) 4 mg/actuation nasal spray Use 1 spray intranasally, then discard. Repeat with new spray every 2 min as needed for opioid overdose symptoms, alternating nostrils. , Print, Disp-1 Each,R-0             NOTIFY YOUR PHYSICIAN FOR ANY OF THE FOLLOWING:   Fever over 101 degrees for 24 hours. Chest pain, shortness of breath, fever, chills, nausea, vomiting, diarrhea, change in mentation, falling, weakness, bleeding. Severe pain or pain not relieved by medications. Or, any other signs or symptoms that you may have questions about. DISPOSITION:  X  Home With:   OT  PT  HH  RN       Long term SNF/Inpatient Rehab    Independent/assisted living    Hospice    Other:       PATIENT CONDITION AT DISCHARGE:     Functional status    Poor     Deconditioned    X Independent      Cognition   X Lucid     Forgetful     Dementia      Catheters/lines (plus indication)    Sin     PICC     PEG    X None      Code status    X Full code     DNR      PHYSICAL EXAMINATION AT DISCHARGE:    General:  Alert, cooperative, no distress, appears stated age. Lungs:   Clear to auscultation bilaterally. Heart:  Regular rate and rhythm, S1, S2 normal, no murmur, click, rub or gallop. Abdomen:   Soft, slightly TTP diffusely, non-distended. Bowel sounds normal.    Extremities: Extremities normal, atraumatic, no cyanosis or edema.    Skin: Skin color, texture, turgor normal. No rashes or lesions   Neurologic: CNII-XII intact, A&Ox4, SOLIS        /63 (BP 1 Location: Right arm, BP Patient Position: At rest)   Pulse 98   Temp 99.2 °F (37.3 °C)   Resp 16   Ht 5' 6\" (1.676 m)   Wt 61.7 kg (136 lb 1.6 oz)   LMP 09/20/2020   SpO2 96%   Breastfeeding No   BMI 21.97 kg/m²       CHRONIC MEDICAL DIAGNOSES:  Problem List as of 10/22/2020 Never Reviewed          Codes Class Noted - Resolved Severe protein-calorie malnutrition ([facility] ICD-10-CM: E43  ICD-9-CM: 262  10/21/2020 - Present        Acute pancreatitis ICD-10-CM: K85.90  ICD-9-CM: 532.9  10/20/2020 - Present              Greater than 30 minutes were spent with the patient on counseling and coordination of care    Signed:   Bill Zaidi NP  10/24/2020  5:40 PM

## 2020-10-25 LAB
BACTERIA SPEC CULT: NORMAL
SERVICE CMNT-IMP: NORMAL

## 2022-03-19 PROBLEM — E43 SEVERE PROTEIN-CALORIE MALNUTRITION (HCC): Status: ACTIVE | Noted: 2020-10-21

## 2022-03-20 PROBLEM — K85.90 ACUTE PANCREATITIS: Status: ACTIVE | Noted: 2020-10-20

## 2022-06-09 ENCOUNTER — APPOINTMENT (OUTPATIENT)
Dept: CT IMAGING | Age: 42
End: 2022-06-09
Attending: STUDENT IN AN ORGANIZED HEALTH CARE EDUCATION/TRAINING PROGRAM
Payer: COMMERCIAL

## 2022-06-09 ENCOUNTER — HOSPITAL ENCOUNTER (EMERGENCY)
Age: 42
Discharge: HOME | End: 2022-06-09
Attending: EMERGENCY MEDICINE
Payer: COMMERCIAL

## 2022-06-09 VITALS
OXYGEN SATURATION: 98 % | SYSTOLIC BLOOD PRESSURE: 101 MMHG | RESPIRATION RATE: 16 BRPM | TEMPERATURE: 97.6 F | HEART RATE: 80 BPM | DIASTOLIC BLOOD PRESSURE: 56 MMHG

## 2022-06-09 DIAGNOSIS — R10.84 ABDOMINAL PAIN, GENERALIZED: Primary | ICD-10-CM

## 2022-06-09 DIAGNOSIS — D25.9 UTERINE LEIOMYOMA, UNSPECIFIED LOCATION: ICD-10-CM

## 2022-06-09 LAB
ALBUMIN SERPL-MCNC: 4.1 G/DL (ref 3.5–5)
ALBUMIN/GLOB SERPL: 1.3 {RATIO} (ref 1.1–2.2)
ALP SERPL-CCNC: 43 U/L (ref 45–117)
ALT SERPL-CCNC: 38 U/L (ref 12–78)
ANION GAP SERPL CALC-SCNC: 3 MMOL/L (ref 5–15)
APPEARANCE UR: CLEAR
AST SERPL-CCNC: 20 U/L (ref 15–37)
BACTERIA URNS QL MICRO: NEGATIVE /HPF
BASOPHILS # BLD: 0 K/UL (ref 0–0.1)
BASOPHILS NFR BLD: 0 % (ref 0–1)
BILIRUB SERPL-MCNC: 0.6 MG/DL (ref 0.2–1)
BILIRUB UR QL: NEGATIVE
BUN SERPL-MCNC: 14 MG/DL (ref 6–20)
BUN/CREAT SERPL: 17 (ref 12–20)
CALCIUM SERPL-MCNC: 9 MG/DL (ref 8.5–10.1)
CHLORIDE SERPL-SCNC: 105 MMOL/L (ref 97–108)
CO2 SERPL-SCNC: 26 MMOL/L (ref 21–32)
COLOR UR: NORMAL
COMMENT, HOLDF: NORMAL
CREAT SERPL-MCNC: 0.82 MG/DL (ref 0.55–1.02)
DIFFERENTIAL METHOD BLD: NORMAL
EOSINOPHIL # BLD: 0.1 K/UL (ref 0–0.4)
EOSINOPHIL NFR BLD: 1 % (ref 0–7)
EPITH CASTS URNS QL MICRO: NORMAL /LPF
ERYTHROCYTE [DISTWIDTH] IN BLOOD BY AUTOMATED COUNT: 12.2 % (ref 11.5–14.5)
GLOBULIN SER CALC-MCNC: 3.1 G/DL (ref 2–4)
GLUCOSE SERPL-MCNC: 100 MG/DL (ref 65–100)
GLUCOSE UR STRIP.AUTO-MCNC: NEGATIVE MG/DL
HCG UR QL: NEGATIVE
HCT VFR BLD AUTO: 36.9 % (ref 35–47)
HGB BLD-MCNC: 12.5 G/DL (ref 11.5–16)
HGB UR QL STRIP: NEGATIVE
HYALINE CASTS URNS QL MICRO: NORMAL /LPF (ref 0–5)
IMM GRANULOCYTES # BLD AUTO: 0 K/UL (ref 0–0.04)
IMM GRANULOCYTES NFR BLD AUTO: 0 % (ref 0–0.5)
KETONES UR QL STRIP.AUTO: NEGATIVE MG/DL
LEUKOCYTE ESTERASE UR QL STRIP.AUTO: NEGATIVE
LIPASE SERPL-CCNC: 120 U/L (ref 73–393)
LYMPHOCYTES # BLD: 1.8 K/UL (ref 0.8–3.5)
LYMPHOCYTES NFR BLD: 27 % (ref 12–49)
MCH RBC QN AUTO: 30.8 PG (ref 26–34)
MCHC RBC AUTO-ENTMCNC: 33.9 G/DL (ref 30–36.5)
MCV RBC AUTO: 90.9 FL (ref 80–99)
MONOCYTES # BLD: 0.6 K/UL (ref 0–1)
MONOCYTES NFR BLD: 9 % (ref 5–13)
NEUTS SEG # BLD: 4.1 K/UL (ref 1.8–8)
NEUTS SEG NFR BLD: 63 % (ref 32–75)
NITRITE UR QL STRIP.AUTO: NEGATIVE
NRBC # BLD: 0 K/UL (ref 0–0.01)
NRBC BLD-RTO: 0 PER 100 WBC
PH UR STRIP: 6.5 [PH] (ref 5–8)
PLATELET # BLD AUTO: 211 K/UL (ref 150–400)
PMV BLD AUTO: 10.2 FL (ref 8.9–12.9)
POTASSIUM SERPL-SCNC: 3.9 MMOL/L (ref 3.5–5.1)
PROT SERPL-MCNC: 7.2 G/DL (ref 6.4–8.2)
PROT UR STRIP-MCNC: NEGATIVE MG/DL
RBC # BLD AUTO: 4.06 M/UL (ref 3.8–5.2)
RBC #/AREA URNS HPF: NORMAL /HPF (ref 0–5)
SAMPLES BEING HELD,HOLD: NORMAL
SODIUM SERPL-SCNC: 134 MMOL/L (ref 136–145)
SP GR UR REFRACTOMETRY: 1.01 (ref 1–1.03)
UR CULT HOLD, URHOLD: NORMAL
UROBILINOGEN UR QL STRIP.AUTO: 0.2 EU/DL (ref 0.2–1)
WBC # BLD AUTO: 6.6 K/UL (ref 3.6–11)
WBC URNS QL MICRO: NORMAL /HPF (ref 0–4)

## 2022-06-09 PROCEDURE — 36415 COLL VENOUS BLD VENIPUNCTURE: CPT

## 2022-06-09 PROCEDURE — 74011000636 HC RX REV CODE- 636: Performed by: RADIOLOGY

## 2022-06-09 PROCEDURE — 81001 URINALYSIS AUTO W/SCOPE: CPT

## 2022-06-09 PROCEDURE — 96374 THER/PROPH/DIAG INJ IV PUSH: CPT

## 2022-06-09 PROCEDURE — 74011250636 HC RX REV CODE- 250/636: Performed by: STUDENT IN AN ORGANIZED HEALTH CARE EDUCATION/TRAINING PROGRAM

## 2022-06-09 PROCEDURE — 80053 COMPREHEN METABOLIC PANEL: CPT

## 2022-06-09 PROCEDURE — 83690 ASSAY OF LIPASE: CPT

## 2022-06-09 PROCEDURE — 85025 COMPLETE CBC W/AUTO DIFF WBC: CPT

## 2022-06-09 PROCEDURE — 99285 EMERGENCY DEPT VISIT HI MDM: CPT

## 2022-06-09 PROCEDURE — 81025 URINE PREGNANCY TEST: CPT

## 2022-06-09 PROCEDURE — 74177 CT ABD & PELVIS W/CONTRAST: CPT

## 2022-06-09 RX ORDER — KETOROLAC TROMETHAMINE 30 MG/ML
30 INJECTION, SOLUTION INTRAMUSCULAR; INTRAVENOUS ONCE
Status: COMPLETED | OUTPATIENT
Start: 2022-06-09 | End: 2022-06-09

## 2022-06-09 RX ADMIN — KETOROLAC TROMETHAMINE 30 MG: 30 INJECTION, SOLUTION INTRAMUSCULAR; INTRAVENOUS at 11:01

## 2022-06-09 RX ADMIN — IOPAMIDOL 100 ML: 755 INJECTION, SOLUTION INTRAVENOUS at 12:18

## 2022-06-09 NOTE — DISCHARGE INSTRUCTIONS
Continue to monitor symptoms at home. Take Advil or Tylenol as needed for pain. Follow-up with PCP and return with any changes or worsening.

## 2022-06-09 NOTE — ED TRIAGE NOTES
Pt c/o generalized abd pain x 2 hours, denies fever, denies diarrhea, +constipation, last bm this am, denies urinary smptoms, +mid back pain , unsure of pregnancy , +nausea, hx of pancreatitis in the past , stated it feels the same but pt does not drink anymore

## 2022-06-09 NOTE — Clinical Note
[address]  [address] [facility] [address]  [phone]    Work/School Note    Date: 6/9/2022    To Whom It May concern:    Mellisa Moise was seen and treated today in the emergency room by the following provider(s):  Attending Provider: Michelle Barahona MD  Physician Assistant: KENYON Santiago. Mellisa Moise is excused from work/school on 06/09/22 and 06/10/22. She is medically clear to return to work/school on 6/11/2022.        Sincerely,          KENYON Mar

## 2022-06-09 NOTE — ED PROVIDER NOTES
23-year-old female with no significant past medical history presents to ED with 2 hours of generalized abdominal pain. Patient notes that the pain started this morning while working out and originally thought it might be a runner's cramp but has consistently gotten worse since then. She notes that it feels \"tight and crampy\". She states this feels similar to several years ago when at the time she was diagnosed with pancreatitis. However, she notes that she has stopped drinking since then and is in recovery from alcohol and opioid addiction. Denies any nausea, vomiting, diarrhea, change in bowel movements, dysuria, urinary frequency, fevers or chills. She has not taken anything for her symptoms. She does note that she has been having some vaginal spotting lately but \"I am due for my period any day now\". Patient unsure of pregnancy status. She also notes that she would not like any opioid medications while here as she is in recovery. The history is provided by the patient. Abdominal Pain  Associated symptoms include abdominal pain. Pertinent negatives include no chest pain, no headaches and no shortness of breath. No past medical history on file. Past Surgical History:   Procedure Laterality Date    ABDOMEN SURGERY PROC UNLISTED           No family history on file.     Social History     Socioeconomic History    Marital status: SINGLE     Spouse name: Not on file    Number of children: Not on file    Years of education: Not on file    Highest education level: Not on file   Occupational History    Not on file   Tobacco Use    Smoking status: Never Smoker    Smokeless tobacco: Never Used   Substance and Sexual Activity    Alcohol use: Yes     Comment: Socially    Drug use: Never    Sexual activity: Not on file   Other Topics Concern    Not on file   Social History Narrative    Not on file     Social Determinants of Health     Financial Resource Strain:     Difficulty of Paying Living Expenses: Not on file   Food Insecurity:     Worried About Running Out of Food in the Last Year: Not on file    Jacob of Food in the Last Year: Not on file   Transportation Needs:     Lack of Transportation (Medical): Not on file    Lack of Transportation (Non-Medical): Not on file   Physical Activity:     Days of Exercise per Week: Not on file    Minutes of Exercise per Session: Not on file   Stress:     Feeling of Stress : Not on file   Social Connections:     Frequency of Communication with Friends and Family: Not on file    Frequency of Social Gatherings with Friends and Family: Not on file    Attends [employer] Services: Not on file    Active Member of [address] [place] [address] [employer] or Organizations: Not on file    Attends Club or Organization Meetings: Not on file    Marital Status: Not on file   Intimate Partner Violence:     Fear of Current or Ex-Partner: Not on file    Emotionally Abused: Not on file    Physically Abused: Not on file    Sexually Abused: Not on file   Housing Stability:     Unable to Pay for Housing in the Last Year: Not on file    Number of [place] in the Last Year: Not on file    Unstable Housing in the Last Year: Not on file         ALLERGIES: Penicillins    Review of Systems   Constitutional: Negative for fever. HENT: Negative for congestion and sinus pressure. Respiratory: Negative for shortness of breath. Cardiovascular: Negative for chest pain. Gastrointestinal: Positive for abdominal pain. Negative for nausea and vomiting. Genitourinary: Negative for dysuria. Musculoskeletal: Negative for myalgias. Neurological: Negative for dizziness and headaches. Hematological: Negative for adenopathy. Psychiatric/Behavioral: The patient is not nervous/anxious. All other systems reviewed and are negative. Vitals:    06/09/22 1040   BP: (!) 101/56   Pulse: 80   Resp: 16   Temp: 97.6 °F (36.4 °C)   SpO2: 98%            Physical Exam  Vitals and nursing note reviewed. Constitutional:       General: She is not in acute distress. Appearance: Normal appearance. She is normal weight. HENT:      Head: Normocephalic and atraumatic. Eyes:      Extraocular Movements: Extraocular movements intact. Pupils: Pupils are equal, round, and reactive to light. Cardiovascular:      Rate and Rhythm: Normal rate and regular rhythm. Heart sounds: Normal heart sounds. Pulmonary:      Breath sounds: Normal breath sounds. Abdominal:      Palpations: Abdomen is soft. Tenderness: There is generalized abdominal tenderness. There is no guarding or rebound. Lymphadenopathy:      Cervical: No cervical adenopathy. Skin:     General: Skin is warm and dry. Neurological:      General: No focal deficit present. Mental Status: She is alert and oriented to person, place, and time. Psychiatric:         Mood and Affect: Mood normal.         Behavior: Behavior normal.         Thought Content: Thought content normal.          MDM  Number of Diagnoses or Management Options  Abdominal pain, generalized  Uterine leiomyoma, unspecified location  Diagnosis management comments: 50-year-old female with no significant past medical history presents to ED with 2 hours of generalized abdominal pain. Vital signs stable in triage and patient is afebrile. Physical exam notable for generalized abdominal tenderness with no guarding or rebound. Pregnancy test negative. Patient received IV Toradol while in emergency with improvement of pain. Labs and urine unremarkable. CT abd/ pelvis revealed uterine leiomyoma but otherwise unremarkable for any acute abnormalities. Patient's symptoms completely relieved upon reevaluation. Suspect dysmenorrhea versus IBS versus muscular spasm. Patient will be discharged with instructions for conservative care, follow-up and return precautions.         Amount and/or Complexity of Data Reviewed  Clinical lab tests: reviewed and ordered  Tests in the radiology section of CPT®: reviewed and ordered  Discuss the patient with other providers: yes           Procedures